# Patient Record
Sex: FEMALE | Race: WHITE | NOT HISPANIC OR LATINO | Employment: OTHER | URBAN - METROPOLITAN AREA
[De-identification: names, ages, dates, MRNs, and addresses within clinical notes are randomized per-mention and may not be internally consistent; named-entity substitution may affect disease eponyms.]

---

## 2018-06-25 ENCOUNTER — OFFICE VISIT (OUTPATIENT)
Dept: OBSTETRICS AND GYNECOLOGY | Facility: CLINIC | Age: 28
End: 2018-06-25

## 2018-06-25 VITALS
SYSTOLIC BLOOD PRESSURE: 118 MMHG | WEIGHT: 140 LBS | BODY MASS INDEX: 24.8 KG/M2 | DIASTOLIC BLOOD PRESSURE: 66 MMHG | HEIGHT: 63 IN

## 2018-06-25 DIAGNOSIS — Q43.8: ICD-10-CM

## 2018-06-25 DIAGNOSIS — Z13.9 SCREENING FOR CONDITION: ICD-10-CM

## 2018-06-25 DIAGNOSIS — O21.9 NAUSEA AND VOMITING IN PREGNANCY PRIOR TO 22 WEEKS GESTATION: ICD-10-CM

## 2018-06-25 DIAGNOSIS — N91.2 AMENORRHEA: Primary | ICD-10-CM

## 2018-06-25 LAB
B-HCG UR QL: POSITIVE
BILIRUB BLD-MCNC: NEGATIVE MG/DL
CLARITY, POC: CLEAR
COLOR UR: YELLOW
GLUCOSE UR STRIP-MCNC: NEGATIVE MG/DL
INTERNAL NEGATIVE CONTROL: NEGATIVE
INTERNAL POSITIVE CONTROL: POSITIVE
KETONES UR QL: NEGATIVE
LEUKOCYTE EST, POC: NEGATIVE
Lab: ABNORMAL
NITRITE UR-MCNC: NEGATIVE MG/ML
PH UR: 6 [PH] (ref 5–8)
PROT UR STRIP-MCNC: NEGATIVE MG/DL
RBC # UR STRIP: NEGATIVE /UL
SP GR UR: 1.02 (ref 1–1.03)
UROBILINOGEN UR QL: NORMAL

## 2018-06-25 PROCEDURE — 81002 URINALYSIS NONAUTO W/O SCOPE: CPT | Performed by: NURSE PRACTITIONER

## 2018-06-25 PROCEDURE — 81025 URINE PREGNANCY TEST: CPT | Performed by: NURSE PRACTITIONER

## 2018-06-25 PROCEDURE — 99214 OFFICE O/P EST MOD 30 MIN: CPT | Performed by: NURSE PRACTITIONER

## 2018-07-02 PROBLEM — O21.9 NAUSEA AND VOMITING IN PREGNANCY PRIOR TO 22 WEEKS GESTATION: Status: ACTIVE | Noted: 2018-07-02

## 2018-07-02 PROBLEM — N91.2 AMENORRHEA: Status: ACTIVE | Noted: 2018-07-02

## 2018-07-03 ENCOUNTER — TELEPHONE (OUTPATIENT)
Dept: OBSTETRICS AND GYNECOLOGY | Facility: CLINIC | Age: 28
End: 2018-07-03

## 2018-07-03 DIAGNOSIS — Q43.8: Primary | ICD-10-CM

## 2018-07-03 NOTE — TELEPHONE ENCOUNTER
Appt with [facility] has been canceled. Dr. Yost says that since the pt herself does not have ashok-danlos then there is no need for her to be seen by them. She would need to see a rheumatologist.

## 2018-07-03 NOTE — TELEPHONE ENCOUNTER
Can you check on a rheumatology appt, her sister has a genetic condition that can be complicating to pregnancy. She needs to be evaluated because she has symptoms. If we can not get her an appt with rheum quickly, we need an alternative plan. Thanks

## 2018-07-05 ENCOUNTER — APPOINTMENT (OUTPATIENT)
Dept: ULTRASOUND IMAGING | Facility: HOSPITAL | Age: 28
End: 2018-07-05

## 2018-07-10 ENCOUNTER — INITIAL PRENATAL (OUTPATIENT)
Dept: OBSTETRICS AND GYNECOLOGY | Facility: CLINIC | Age: 28
End: 2018-07-10

## 2018-07-10 ENCOUNTER — PROCEDURE VISIT (OUTPATIENT)
Dept: OBSTETRICS AND GYNECOLOGY | Facility: CLINIC | Age: 28
End: 2018-07-10

## 2018-07-10 VITALS — SYSTOLIC BLOOD PRESSURE: 122 MMHG | DIASTOLIC BLOOD PRESSURE: 70 MMHG | BODY MASS INDEX: 24.8 KG/M2 | WEIGHT: 140 LBS

## 2018-07-10 DIAGNOSIS — Z34.91 INITIAL OBSTETRIC VISIT IN FIRST TRIMESTER: Primary | ICD-10-CM

## 2018-07-10 DIAGNOSIS — Z36.9 ENCOUNTER FOR ANTENATAL SCREENING, UNSPECIFIED: ICD-10-CM

## 2018-07-10 DIAGNOSIS — Q43.8: ICD-10-CM

## 2018-07-10 DIAGNOSIS — O36.80X0 ENCOUNTER TO DETERMINE FETAL VIABILITY OF PREGNANCY, SINGLE OR UNSPECIFIED FETUS: Primary | ICD-10-CM

## 2018-07-10 LAB
C TRACH RRNA SPEC DONR QL NAA+PROBE: NEGATIVE
EXTERNAL AMPHETAMINE SCREEN URINE: NEGATIVE
N GONORRHOEA DNA SPEC QL NAA+PROBE: NEGATIVE

## 2018-07-10 PROCEDURE — 0501F PRENATAL FLOW SHEET: CPT | Performed by: NURSE PRACTITIONER

## 2018-07-10 PROCEDURE — 76801 OB US < 14 WKS SINGLE FETUS: CPT | Performed by: NURSE PRACTITIONER

## 2018-07-10 RX ORDER — PRENATAL VIT/IRON FUM/FOLIC AC 27MG-0.8MG
TABLET ORAL DAILY
COMMUNITY
End: 2019-05-16

## 2018-07-10 NOTE — PROGRESS NOTES
Initial ob visit     Chief Complaint   Patient presents with   • Initial Prenatal Visit       Hilary Morgan is being seen today for her first obstetrical visit.  She is a 27 y.o.    10w2d gestation.     #: 1, Date: None, Sex: None, Weight: None, GA: None, Delivery: None, Apgar1: None, Apgar5: None, Living: None, Birth Comments: None    #: 2, Date: None, Sex: None, Weight: None, GA: None, Delivery: None, Apgar1: None, Apgar5: None, Living: None, Birth Comments: None      Prepregnancy BMI - Body mass index is 24.8 kg/m².    Past Medical History:   Diagnosis Date   • Congenital prolapsed rectum    • Congenital prolapsed rectum        No past surgical history on file.      Current Outpatient Prescriptions:   •  Prenatal Vit-Fe Fumarate-FA (PRENATAL VITAMIN 27-0.8) 27-0.8 MG tablet tablet, Take  by mouth Daily., Disp: , Rfl:     No Known Allergies    Social History     Social History   • Marital status:      Spouse name: N/A   • Number of children: N/A   • Years of education: N/A     Occupational History   • Not on file.     Social History Main Topics   • Smoking status: Never Smoker   • Smokeless tobacco: Not on file   • Alcohol use No   • Drug use: No   • Sexual activity: Yes     Partners: Male     Other Topics Concern   • Not on file     Social History Narrative   • No narrative on file       Family History   Problem Relation Age of Onset   • No Known Problems Father    • No Known Problems Mother    • Shelley-Danlos syndrome Sister        Review of systems     A comprehensive review of systems was negative except for: Gastrointestinal: positive for nausea     Objective    /70   Wt 63.5 kg (140 lb)   LMP 2018 (Exact Date)   BMI 24.80 kg/m²       General Appearance:    Alert, cooperative, in no acute distress, habitus normal    Head:    Not examined   Eyes:           Not examined   Ears:  Not examined       Neck: Not examined    Back:     No kyphosis present, no scoliosis present,                        Lungs:     Clear to auscultation,respirations regular, even and                   unlabored    Heart:    Regular rhythm and normal rate, normal S1 and S2, no            murmur, no gallop, no rub, no click   Breast Exam:    No masses, No nipple discharge   Abdomen:     Normal bowel sounds, no masses, no organomegaly, soft        non-tender, non-distended, no guarding, no rebound                 tenderness   Genitalia:    Vulva - No masses, no atrophy, no lesions    Vagina - No discharge, No bleeding    Cervix - No Lesions, closed. Pap collected.      Uterus - Consistent with 10 weeks.     Adnexa - No masses, non tender       Extremities:   Moves all extremities well, no edema, no cyanosis, no              redness   Pulses:   Pulses palpable and equal bilaterally   Skin:   No bleeding, bruising or rash   Lymph nodes:   No palpable adenopathy   Neurologic:   Sensation intact, A&O times 3      Assessment    1) Pregnancy at 10w2d- US IMP: Single, viable IUP @ 10.2 weeks. +FHTs.   2) Nausea- Is improving. Taking Vit B6.   3) Family history of Shelley Danlos Syndrome- Pt has never been tested. She does have a congenital prolapsed rectum. She reports she has not seen a physician for this since she was a child. She is worried she could have Shelley Danlos. Ref to rheumatology is in procress.        Plan  New OB exam completed, pap collected   Initial labs collected   Patient is taking Prenatal vitamins  Problem list reviewed and updated  Reviewed routine prenatal care with the office to include but not limited to expected weight gain during pregnancy, Tylenol products are fine, avoid aspirin and ibuprofen; Zika (travel restrictions/ok to use insect repellant); not to change cat litter; food restrictions; avoidance of alcohol, tobacco, drugs and saunas/hot tubs.   All questions answered.     RTO 4 weeks    Ana Carreon, [facility]  7/10/2018  4:33 PM

## 2018-07-11 LAB
ABO GROUP BLD: (no result)
BASOPHILS # BLD AUTO: 0 X10E3/UL (ref 0–0.2)
BASOPHILS NFR BLD AUTO: 0 %
BLD GP AB SCN SERPL QL: NEGATIVE
EOSINOPHIL # BLD AUTO: 0 X10E3/UL (ref 0–0.4)
EOSINOPHIL NFR BLD AUTO: 0 %
ERYTHROCYTE [DISTWIDTH] IN BLOOD BY AUTOMATED COUNT: 13.2 % (ref 12.3–15.4)
HBA1C MFR BLD: 4.6 % (ref 4.8–5.6)
HBV SURFACE AG SERPL QL IA: NEGATIVE
HCT VFR BLD AUTO: 37.1 % (ref 34–46.6)
HCV AB S/CO SERPL IA: <0.1 S/CO RATIO (ref 0–0.9)
HGB BLD-MCNC: 12.7 G/DL (ref 11.1–15.9)
HIV 1+2 AB+HIV1 P24 AG SERPL QL IA: NON REACTIVE
IMM GRANULOCYTES # BLD: 0 X10E3/UL (ref 0–0.1)
IMM GRANULOCYTES NFR BLD: 0 %
LYMPHOCYTES # BLD AUTO: 1.2 X10E3/UL (ref 0.7–3.1)
LYMPHOCYTES NFR BLD AUTO: 12 %
MCH RBC QN AUTO: 29.6 PG (ref 26.6–33)
MCHC RBC AUTO-ENTMCNC: 34.2 G/DL (ref 31.5–35.7)
MCV RBC AUTO: 87 FL (ref 79–97)
MONOCYTES # BLD AUTO: 0.6 X10E3/UL (ref 0.1–0.9)
MONOCYTES NFR BLD AUTO: 6 %
NEUTROPHILS # BLD AUTO: 7.9 X10E3/UL (ref 1.4–7)
NEUTROPHILS NFR BLD AUTO: 82 %
PLATELET # BLD AUTO: 261 X10E3/UL (ref 150–379)
RBC # BLD AUTO: 4.29 X10E6/UL (ref 3.77–5.28)
RH BLD: POSITIVE
RPR SER QL: NON REACTIVE
RUBV IGG SERPL IA-ACNC: 1.4 INDEX
WBC # BLD AUTO: 9.8 X10E3/UL (ref 3.4–10.8)

## 2018-07-12 LAB
BACTERIA UR CULT: NO GROWTH
BACTERIA UR CULT: NORMAL
C TRACH RRNA CVX QL NAA+PROBE: NEGATIVE
CONV .: NORMAL
CYTOLOGIST CVX/VAG CYTO: NORMAL
CYTOLOGY CVX/VAG DOC THIN PREP: NORMAL
DX ICD CODE: NORMAL
HIV 1 & 2 AB SER-IMP: NORMAL
N GONORRHOEA RRNA CVX QL NAA+PROBE: NEGATIVE
OTHER STN SPEC: NORMAL
PATH REPORT.FINAL DX SPEC: NORMAL
STAT OF ADQ CVX/VAG CYTO-IMP: NORMAL
T VAGINALIS RRNA SPEC QL NAA+PROBE: NEGATIVE

## 2018-07-13 LAB — DRUGS UR: NORMAL

## 2018-07-23 ENCOUNTER — TELEPHONE (OUTPATIENT)
Dept: OBSTETRICS AND GYNECOLOGY | Facility: CLINIC | Age: 28
End: 2018-07-23

## 2018-07-23 NOTE — TELEPHONE ENCOUNTER
"Pt called saying that she had fallen and wanted to know what sign and symptoms to look out for if something should go wrong. Said she hasn't been experiencing any abnormal pain (aside from what she is considering round ligament pain which is normal for her), cramping, bleeding or spotting. I informed her that all of those things would be what she needs to be aware of if there should be any indication something is \"wrong\" this early on in the pregnancy. Advised her to call and speak to you later tonight should she experience any of the above and to go to the ER if you saw fit.   "

## 2018-07-25 ENCOUNTER — TELEPHONE (OUTPATIENT)
Dept: OBSTETRICS AND GYNECOLOGY | Facility: CLINIC | Age: 28
End: 2018-07-25

## 2018-07-25 NOTE — TELEPHONE ENCOUNTER
Yes, any trauma can rarely lead to abruption but usually within the first 4 hrs after the trauma. If no bleeding and good fetal movement after 4 hrs, everything will be OK and no long term damage to fetus.

## 2018-07-25 NOTE — TELEPHONE ENCOUNTER
SENT REFERRAL TO [facility] THEY SENT BACK STATING THEY DO NOT SEE FOR DX Q43.8 CONGENITAL PROLAPSED RECTUM AND STATED THEY RECOMMENDED GENETICS

## 2018-07-26 NOTE — TELEPHONE ENCOUNTER
We think she could have yooner's danlos syndome but there is no way to include that in the diagnosis. Can you call them back?

## 2018-07-26 NOTE — TELEPHONE ENCOUNTER
Called and left message for reji to call me back [phone] ext [phone] added possible erhlers danlos syndrome to referral and refaxed [phone]

## 2018-08-07 ENCOUNTER — ROUTINE PRENATAL (OUTPATIENT)
Dept: OBSTETRICS AND GYNECOLOGY | Facility: CLINIC | Age: 28
End: 2018-08-07

## 2018-08-07 VITALS — SYSTOLIC BLOOD PRESSURE: 106 MMHG | BODY MASS INDEX: 24.8 KG/M2 | DIASTOLIC BLOOD PRESSURE: 60 MMHG | WEIGHT: 140 LBS

## 2018-08-07 DIAGNOSIS — Q43.8: ICD-10-CM

## 2018-08-07 DIAGNOSIS — Q79.60 EHLERS-DANLOS DISEASE: Primary | ICD-10-CM

## 2018-08-07 PROBLEM — N91.2 AMENORRHEA: Status: RESOLVED | Noted: 2018-07-02 | Resolved: 2018-08-07

## 2018-08-07 PROBLEM — Z34.91 INITIAL OBSTETRIC VISIT IN FIRST TRIMESTER: Status: RESOLVED | Noted: 2018-07-10 | Resolved: 2018-08-07

## 2018-08-07 PROCEDURE — 0502F SUBSEQUENT PRENATAL CARE: CPT | Performed by: OBSTETRICS & GYNECOLOGY

## 2018-08-07 NOTE — PROGRESS NOTES
Pt had multiple concers, all addressed..  Pt has shelley danlos syndrome. Extremely loose joints, fragile or stretchy skin, and a family history of Shelley-Danlos syndrome are often enough to make a diagnosis. Genetic tests can confirm the diagnosis in some cases and help rule out other problems.    Pt is still waiting for call from [facility].

## 2018-08-15 ENCOUNTER — TELEPHONE (OUTPATIENT)
Dept: OBSTETRICS AND GYNECOLOGY | Facility: CLINIC | Age: 28
End: 2018-08-15

## 2018-08-15 NOTE — TELEPHONE ENCOUNTER
Patient was seen by geneticists and they did dx with ashok danlos syndrome and they will send report

## 2018-08-16 DIAGNOSIS — Q79.60 EHLERS-DANLOS DISEASE: Primary | ICD-10-CM

## 2018-08-21 ENCOUNTER — ROUTINE PRENATAL (OUTPATIENT)
Dept: OBSTETRICS AND GYNECOLOGY | Facility: CLINIC | Age: 28
End: 2018-08-21

## 2018-08-21 VITALS — WEIGHT: 141 LBS | SYSTOLIC BLOOD PRESSURE: 130 MMHG | DIASTOLIC BLOOD PRESSURE: 62 MMHG | BODY MASS INDEX: 24.98 KG/M2

## 2018-08-21 DIAGNOSIS — Z3A.16 16 WEEKS GESTATION OF PREGNANCY: Primary | ICD-10-CM

## 2018-08-21 PROCEDURE — 0502F SUBSEQUENT PRENATAL CARE: CPT | Performed by: OBSTETRICS & GYNECOLOGY

## 2018-08-21 NOTE — PROGRESS NOTES
Rash, right shoulder pain, pelvic pain, thigh pain, unable to sleep due to pain, elbow, and knee pain

## 2018-08-21 NOTE — PROGRESS NOTES
OB follow up     Hilary Morgan is a 27 y.o.  16w2d being seen today for her obstetrical visit.  Patient reports no bleeding, no contractions and no leaking. Fetal movement: normal.    Review of Systems  No bleeding, No cramping/contractions     /62   Wt 64 kg (141 lb)   LMP 2018 (Exact Date)   BMI 24.98 kg/m²     FHT: present BPM   Uterine Size: 16 cm   Quad screen drawn.     Assessment/Plan:    1) 27 y.o.  -pregnancy at 16w2d    2)   Encounter Diagnosis   Name Primary?   • 16 weeks gestation of pregnancy Yes       3) Reviewed this stage of pregnancy  4) Problem list updated     Return in about 4 weeks (around 2018) for US, Anatomy, OB Tummy.      Jeferson Rocha MD    2018  5:14 PM

## 2018-08-28 LAB
2ND TRIMESTER 4 SCREEN SERPL-IMP: NORMAL
2ND TRIMESTER 4 SCREEN SERPL-IMP: NORMAL
AFP ADJ MOM SERPL: 0.85
AFP SERPL-MCNC: 29.9 NG/ML
AGE AT DELIVERY: 26.1 YR
FET TS 18 RISK FROM MAT AGE: NORMAL
FET TS 21 RISK FROM MAT AGE: 977
GA METHOD: NORMAL
GA: 16.2 WEEKS
HCG ADJ MOM SERPL: 0.6
HCG SERPL-ACNC: NORMAL MIU/ML
IDDM PATIENT QL: NO
INHIBIN A ADJ MOM SERPL: 0.54
INHIBIN A SERPL-MCNC: 95.49 PG/ML
LABORATORY COMMENT REPORT: NORMAL
MULTIPLE PREGNANCY: NO
NEURAL TUBE DEFECT RISK FETUS: NORMAL %
RESULT: NORMAL
TS 18 RISK FETUS: NORMAL
TS 21 RISK FETUS: NORMAL
U ESTRIOL ADJ MOM SERPL: 0.98
U ESTRIOL SERPL-MCNC: 0.81 NG/ML

## 2018-08-29 ENCOUNTER — HOSPITAL ENCOUNTER (OUTPATIENT)
Dept: ULTRASOUND IMAGING | Facility: HOSPITAL | Age: 28
Discharge: HOME | End: 2018-08-29
Admitting: NURSE PRACTITIONER

## 2018-08-29 DIAGNOSIS — Q79.60 EHLERS-DANLOS DISEASE: Primary | ICD-10-CM

## 2018-08-29 DIAGNOSIS — Q79.60 EHLERS-DANLOS DISEASE: ICD-10-CM

## 2018-08-29 DIAGNOSIS — Z3A.17 17 WEEKS GESTATION OF PREGNANCY: ICD-10-CM

## 2018-08-29 PROCEDURE — 76805 OB US >/= 14 WKS SNGL FETUS: CPT

## 2018-08-29 PROCEDURE — 76817 TRANSVAGINAL US OBSTETRIC: CPT

## 2018-08-29 NOTE — CONSULTS
[facility]  Maternal-Fetal Medicine Consult Note    Dear Ms. Carreon:     I saw the above named patient for consultation upon your request due to Shelley-Danlos Syndrome.     I was able to view her prenatal record in EPIC.     Normal fetal anatomy and cervical length..     There is an increased risk for PROM, PTL, and PTD - as high as 25%.  There is an increased of intrapartum and postpartum hemorrhage, appropriate precautions advised.  Operative delivery should be avoided if possible as there are reported increased risk for complications of healing either vaginally or abdominally.     As it is autosomal dominant, 50% of offspring with have the disease - particular attention to hip dislocation.     A follow up scan at 28-30 weeks is suggested to screen for SGA/IUGR, placental cord insertion, and assess for appropriate interval fetal growth.     Subsequent cervical length assessment for patient symptomatology of contractions or suspicion of PTL.     Recommend predelivery anesthesia consult and thorough cardiology consult to rule out vascular type of EDS.     Recommend general surgery or colon rectal consultation regarding rectal prolapse that is reported to have been present since birth.     If you wish, the follow up imaging and testing can be performed in your office.  Alternatively, we would be happy to perform the evaluation in the [facility].  Please call to schedule if you would prefer.     Cystic Fibrosis and SMA carrier screening is advised by ACOG and ACMG, and I encourage screening through your office.  Alternatively, consider Expanded Carrier Screening.     ACOG along with the CDC recommend Tdap vaccine after 20 weeks gestation during each pregnancy and a flu vaccine during the flu season to provide passive immunity to the .     ACOG and USPSTF advise low dose aspirin (81 mg daily) after 12 weeks for history of preeclampsia, multifetal gestation, CHTN, Type I or II DM, renal disease, SLE or APLAS.  Those  with multiple risks including obesity, nulliparity, low socioeconomic status, age > 35, or AA race may also be considered candidates.      DVT Prophylaxis: SCDs during any hospital care, particularly peripartum.  If C/S  delivery is required, recommend SCDs and antibiotic prophylaxis plus early ambulation, and postpartum Lovenox 60 mg daily beginning 12-24 hrs post-op and continued to hospital discharge.     For billing purposes, 15 min spent face-to-face with the patient of which > 50% devoted to patient counseling and coordination of care, exclusive of ultrasound exam.     If you have any questions about the results of this sonogram or my recommendations, please feel free to contact me at any time.     Thank you for referring this patient to the [facility] at [facility].  If you have any questions about the results of this examination or my recommendations, please feel free to contact me at your convenience.     Sincerely yours,    Neymar Yost MD  8/29/2018  1:06 PM

## 2018-09-05 ENCOUNTER — OFFICE VISIT (OUTPATIENT)
Dept: SURGERY | Facility: CLINIC | Age: 28
End: 2018-09-05

## 2018-09-05 VITALS
DIASTOLIC BLOOD PRESSURE: 70 MMHG | HEART RATE: 88 BPM | OXYGEN SATURATION: 99 % | SYSTOLIC BLOOD PRESSURE: 110 MMHG | TEMPERATURE: 98.6 F | WEIGHT: 142.8 LBS | HEIGHT: 63 IN | BODY MASS INDEX: 25.3 KG/M2

## 2018-09-05 DIAGNOSIS — K62.3 RECTAL PROLAPSE: Primary | ICD-10-CM

## 2018-09-05 DIAGNOSIS — Q79.60 EHLERS-DANLOS SYNDROME: ICD-10-CM

## 2018-09-05 PROCEDURE — 99244 OFF/OP CNSLTJ NEW/EST MOD 40: CPT | Performed by: COLON & RECTAL SURGERY

## 2018-09-05 RX ORDER — ACETAMINOPHEN 325 MG/1
325 TABLET ORAL EVERY 6 HOURS PRN
COMMUNITY
End: 2019-04-11

## 2018-09-05 NOTE — PROGRESS NOTES
Hilary Morgan is a 27 y.o. female who is seen as a consult at the request of SIRE[facility] Mcneil for extruding rectal tissue      HPI:    Pt who is 18 weeks pregnant with recently diagnosed Shelley-Danlos c/o extruding tissue per rectum: about an inch    She states rectal prolapse noted in infancy, but reduced immediately, and no intervention required  Again noted age 9, but spontaneously reduced, so no intervention  She has never had a time when she has not been able to reduce  She has never had surgery for this    Tissue prolapses with every BM    She has BM qod-qd  She is taking colace 3 times in the past month  She previously took benefiber which helped for about a month, but then stopped helping, so she d/c'ed  She has not tried miralax    No enemas or suppositories    She states her stools are dark  Taking prenatal vitamin with Fe    Pt c/o fecal urgency with 3 accidents in the past year, full soilage    No blood per rectum  She denies mucus  Occasional anorectal pain    She has never had a colonoscopy    FamHx: no known hx colon polyps or colon cancer.  Hepatic cancer father diagnosed in his 50s, secondary to EtOH abuse per pt    Pt states OB/GYNs asking if she can have vaginal birth, or if rectal prolapse will necessitate     Past Medical History:   Diagnosis Date   • Congenital prolapsed rectum    • Shelley-Danlos disease    • Joint dislocation     MULTIPLE ESPECIALLY IN ANKLES       History reviewed. No pertinent surgical history.    Social History:   reports that she has never smoked. She has never used smokeless tobacco. She reports that she drinks alcohol. She reports that she does not use drugs.      Marriage status:     Family History   Problem Relation Age of Onset   • Liver disease Father    • No Known Problems Mother    • Shelley-Danlos syndrome Sister    • Anxiety disorder Maternal Aunt    • Fibromyalgia Maternal Aunt    • Shelley-Danlos syndrome Maternal Aunt    • Anxiety disorder  Maternal Uncle    • Depression Maternal Uncle    • Thyroid disease Maternal Grandmother    • No Known Problems Maternal Grandfather          Current Outpatient Prescriptions:   •  acetaminophen (TYLENOL) 325 MG tablet, Take 650 mg by mouth Every 6 (Six) Hours As Needed for Mild Pain ., Disp: , Rfl:   •  Prenatal Vit-Fe Fumarate-FA (PRENATAL VITAMIN 27-0.8) 27-0.8 MG tablet tablet, Take  by mouth Daily., Disp: , Rfl:     Allergy  Patient has no known allergies.    Review of Systems   Constitution: Negative for decreased appetite, weakness and weight gain.   HENT: Negative for congestion, hearing loss and hoarse voice.    Eyes: Negative for blurred vision, discharge and visual disturbance.   Cardiovascular: Negative for chest pain, cyanosis and leg swelling.   Respiratory: Negative for cough, shortness of breath, sleep disturbances due to breathing and snoring.    Endocrine: Positive for heat intolerance. Negative for cold intolerance.   Hematologic/Lymphatic: Bruises/bleeds easily.   Skin: Positive for itching. Negative for poor wound healing and skin cancer.   Musculoskeletal: Positive for back pain, joint pain and joint swelling. Negative for arthritis.   Gastrointestinal: Positive for change in bowel habit, bowel incontinence and constipation. Negative for abdominal pain.   Genitourinary: Positive for bladder incontinence. Negative for dysuria and hematuria.   Neurological: Positive for headaches. Negative for brief paralysis, excessive daytime sleepiness, dizziness, focal weakness and light-headedness.   Psychiatric/Behavioral: Negative for altered mental status and hallucinations. The patient does not have insomnia.    Allergic/Immunologic: Negative for HIV exposure and persistent infections.   All other systems reviewed and are negative.      Vitals:    09/05/18 1318   BP: 110/70   Pulse: 88   Temp: 98.6 °F (37 °C)   SpO2: 99%     Body mass index is 25.3 kg/m².    Physical Exam   Constitutional: She is oriented  to person, place, and time. She appears well-developed and well-nourished. No distress.   HENT:   Head: Normocephalic and atraumatic.   Nose: Nose normal.   Mouth/Throat: Oropharynx is clear and moist.   Eyes: Pupils are equal, round, and reactive to light. Conjunctivae and EOM are normal.   Neck: Normal range of motion. No tracheal deviation present.   Pulmonary/Chest: Effort normal and breath sounds normal. No respiratory distress.   Abdominal: Soft. Bowel sounds are normal. She exhibits no distension.   Genitourinary:   Genitourinary Comments: Commode exam: 1cm circumferential prolapse on bear down, spontaneously reduces   Musculoskeletal: Normal range of motion. She exhibits no edema or deformity.   Neurological: She is alert and oriented to person, place, and time. No cranial nerve deficit. Coordination and gait normal.   Skin: Skin is warm and dry.   Psychiatric: She has a normal mood and affect. Her behavior is normal. Judgment normal.       Review of Medical Record:  I reviewed [facility] records: rectal prolapse    Assessment:  1. Rectal prolapse    2. Shelley-Danlos syndrome        Plan:    -extensive discussion with patient as she is pregnant, and rectal prolapse currently small and reduces spontaneously, would not recommend rectopexy at this time.  Would recommend reevaluation for rectal prolapse repair when she is no longer pregnant, or if symptoms worsen.  -would recommend  over vaginal delivery, as prolapse could worsen, or possibly incarcerate/strangulate with straining during trial of labor  -for constipation, recommend miralax, and gave written instructions for use    RTC after she delivers, or if symptoms worsen      Scribed for Gabby Tenorio MD by Julia Padilla PA-C 2018  This patient was evaluated by me, recommendations made, documentation reviewed, edited, and revised by me, Gabby Tenorio MD

## 2018-09-06 PROBLEM — O28.3 ABNORMAL PREGNANCY US: Status: ACTIVE | Noted: 2018-09-06

## 2018-09-19 ENCOUNTER — ROUTINE PRENATAL (OUTPATIENT)
Dept: OBSTETRICS AND GYNECOLOGY | Facility: CLINIC | Age: 28
End: 2018-09-19

## 2018-09-19 ENCOUNTER — PROCEDURE VISIT (OUTPATIENT)
Dept: OBSTETRICS AND GYNECOLOGY | Facility: CLINIC | Age: 28
End: 2018-09-19

## 2018-09-19 VITALS — SYSTOLIC BLOOD PRESSURE: 120 MMHG | DIASTOLIC BLOOD PRESSURE: 76 MMHG

## 2018-09-19 DIAGNOSIS — Z36.89 ENCOUNTER FOR FETAL ANATOMIC SURVEY: Primary | ICD-10-CM

## 2018-09-19 DIAGNOSIS — O47.02 THREATENED PREMATURE LABOR IN SECOND TRIMESTER: ICD-10-CM

## 2018-09-19 DIAGNOSIS — Q79.60 EHLERS-DANLOS SYNDROME: ICD-10-CM

## 2018-09-19 DIAGNOSIS — Z34.82 PRENATAL CARE, SUBSEQUENT PREGNANCY IN SECOND TRIMESTER: Primary | ICD-10-CM

## 2018-09-19 PROCEDURE — 90656 IIV3 VACC NO PRSV 0.5 ML IM: CPT | Performed by: OBSTETRICS & GYNECOLOGY

## 2018-09-19 PROCEDURE — 90471 IMMUNIZATION ADMIN: CPT | Performed by: OBSTETRICS & GYNECOLOGY

## 2018-09-19 PROCEDURE — 76805 OB US >/= 14 WKS SNGL FETUS: CPT | Performed by: OBSTETRICS & GYNECOLOGY

## 2018-09-19 PROCEDURE — 76817 TRANSVAGINAL US OBSTETRIC: CPT | Performed by: OBSTETRICS & GYNECOLOGY

## 2018-09-19 PROCEDURE — 90472 IMMUNIZATION ADMIN EACH ADD: CPT | Performed by: OBSTETRICS & GYNECOLOGY

## 2018-09-19 PROCEDURE — 0502F SUBSEQUENT PRENATAL CARE: CPT | Performed by: OBSTETRICS & GYNECOLOGY

## 2018-09-20 PROCEDURE — 90715 TDAP VACCINE 7 YRS/> IM: CPT | Performed by: OBSTETRICS & GYNECOLOGY

## 2018-09-24 ENCOUNTER — ROUTINE PRENATAL (OUTPATIENT)
Dept: OBSTETRICS AND GYNECOLOGY | Facility: CLINIC | Age: 28
End: 2018-09-24

## 2018-09-24 VITALS — BODY MASS INDEX: 26.57 KG/M2 | DIASTOLIC BLOOD PRESSURE: 76 MMHG | SYSTOLIC BLOOD PRESSURE: 122 MMHG | WEIGHT: 150 LBS

## 2018-09-24 DIAGNOSIS — Q79.62 EHLERS-DANLOS, HYPERMOBILE TYPE: Primary | ICD-10-CM

## 2018-09-24 DIAGNOSIS — Q43.8: ICD-10-CM

## 2018-09-24 DIAGNOSIS — Z3A.21 21 WEEKS GESTATION OF PREGNANCY: ICD-10-CM

## 2018-09-24 PROBLEM — Z34.90 PREGNANCY: Status: ACTIVE | Noted: 2018-09-24

## 2018-09-24 PROCEDURE — 0502F SUBSEQUENT PRENATAL CARE: CPT | Performed by: OBSTETRICS & GYNECOLOGY

## 2018-09-24 NOTE — PROGRESS NOTES
Here for f/u.  Pt has occassional falls.    Pending:  Cardiology consult, PAT consult with anesthesia; further genetic testing (CF, SGA).  Will discuss pt's further care at next MD meeting.

## 2018-09-26 PROBLEM — O09.90 HIGH-RISK PREGNANCY: Status: ACTIVE | Noted: 2018-09-26

## 2018-10-01 ENCOUNTER — ROUTINE PRENATAL (OUTPATIENT)
Dept: OBSTETRICS AND GYNECOLOGY | Facility: CLINIC | Age: 28
End: 2018-10-01

## 2018-10-01 VITALS — DIASTOLIC BLOOD PRESSURE: 64 MMHG | WEIGHT: 149.8 LBS | BODY MASS INDEX: 26.54 KG/M2 | SYSTOLIC BLOOD PRESSURE: 100 MMHG

## 2018-10-01 DIAGNOSIS — Q79.62 EHLERS-DANLOS, HYPERMOBILE TYPE: ICD-10-CM

## 2018-10-01 DIAGNOSIS — O28.3 ABNORMAL PREGNANCY US: ICD-10-CM

## 2018-10-01 DIAGNOSIS — O09.92 HIGH-RISK PREGNANCY IN SECOND TRIMESTER: Primary | ICD-10-CM

## 2018-10-01 DIAGNOSIS — Q43.8: ICD-10-CM

## 2018-10-01 PROCEDURE — 59425 ANTEPARTUM CARE ONLY: CPT | Performed by: OBSTETRICS & GYNECOLOGY

## 2018-10-01 NOTE — PROGRESS NOTES
Patient Active Problem List   Diagnosis   • Congenital prolapsed rectum   • Nausea and vomiting in pregnancy prior to 22 weeks gestation   • Shelley-Danlos, hypermobile type   • Abnormal pregnancy US   • Pregnancy   • High-risk pregnancy- MD meeting recs transfer to [facility]         Pt doing ok.  Pelvic discomfort seems to be increasing.     Had discussion with pt and her .  Due to high risk conditions of pregnancy, after careful discussion and consideration, we recommend that pt transfer her care to [facility] for further care to due issures related to her Shelley-Danlos diagnosis.  Pt and her  understand and agree and consent to transfer.  Transfer of record release signed.

## 2018-10-01 NOTE — PROGRESS NOTES
"Date of Office Visit: 10/02/2018  Encounter Provider: Christiano Julian MD  Place of Service: [facility] CARDIOLOGY  Patient Name: Hilary Morgan  :1990    Chief Complaint   Patient presents with   • Shelley-Danlos Syndrome   :     HPI: Hilary Morgan is a 27 y.o. female who presents today in consultation for a new diagnosis of EDS in pregnancy.  She has always suspected that she has EDS but recently received the clinical diagnosis of Hypermobile EDS at the [facility].  Her sister has EDS.  She has had recurrent joint dislocations.  She has congenital rectal prolapse.    She denies any history of tachycardia, palpitations, syncope, or near syncope.  She denies any history of chest pain.  She is currently 22 weeks pregnant with her first child and had \"morning sickness\" her first trimester but it's improved.  She denies any family history of sudden death at a young age.    Past Medical History:   Diagnosis Date   • Congenital prolapsed rectum    • Shelley-Danlos disease    • Joint dislocation     MULTIPLE ESPECIALLY IN ANKLES       Past Surgical History:   Procedure Laterality Date   • NO PAST SURGERIES         Social History     Social History   • Marital status:      Spouse name: N/A   • Number of children: 0   • Years of education: N/A     Occupational History   • Not on file.     Social History Main Topics   • Smoking status: Never Smoker   • Smokeless tobacco: Never Used      Comment: CAFFEINE USE: NONE   • Alcohol use No      Comment: MODERATE AMT   • Drug use: No   • Sexual activity: Yes     Partners: Male     Birth control/ protection: None     Other Topics Concern   • Not on file     Social History Narrative   • No narrative on file       Family History   Problem Relation Age of Onset   • Liver disease Father    • No Known Problems Mother    • Shelley-Danlos syndrome Sister    • Anxiety disorder Maternal Aunt    • Fibromyalgia Maternal Aunt    • Shelley-Danlos " "syndrome Maternal Aunt    • Anxiety disorder Maternal Uncle    • Depression Maternal Uncle    • Thyroid disease Maternal Grandmother    • No Known Problems Maternal Grandfather        Review of Systems   Cardiovascular: Negative for chest pain.   Musculoskeletal: Positive for joint pain.   Gastrointestinal: Positive for nausea.   Neurological: Negative for light-headedness.       No Known Allergies      Current Outpatient Prescriptions:   •  acetaminophen (TYLENOL) 325 MG tablet, Take 325 mg by mouth Every 6 (Six) Hours As Needed for Mild Pain ., Disp: , Rfl:   •  aspirin 81 MG tablet, Take 81 mg by mouth Daily., Disp: , Rfl:   •  Prenatal Vit-Fe Fumarate-FA (PRENATAL VITAMIN 27-0.8) 27-0.8 MG tablet tablet, Take  by mouth Daily., Disp: , Rfl:       Objective:     Vitals:    10/02/18 1306   BP: 102/56   BP Location: Right arm   Pulse: 86   Weight: 68 kg (150 lb)   Height: 160 cm (63\")     Body mass index is 26.57 kg/m².    Physical Exam   Constitutional: She is oriented to person, place, and time. She appears well-developed and well-nourished.   HENT:   Head: Normocephalic.   Nose: Nose normal.   Mouth/Throat: Oropharynx is clear and moist.   Eyes: Pupils are equal, round, and reactive to light. Conjunctivae and EOM are normal.   Neck: Normal range of motion. No JVD present.   Cardiovascular: Normal rate, regular rhythm and intact distal pulses.    Murmur heard.   Systolic murmur is present with a grade of 1/6  at the upper right sternal border  Pulmonary/Chest: Effort normal and breath sounds normal.   Abdominal: Soft.   Gravid, nontender   Musculoskeletal: Normal range of motion. She exhibits no edema.   Lymphadenopathy:     She has no cervical adenopathy.   Neurological: She is alert and oriented to person, place, and time. No cranial nerve deficit.   Skin: Skin is warm and dry. No rash noted.   Psychiatric: She has a normal mood and affect. Her behavior is normal. Judgment and thought content normal.   Vitals " reviewed.        ECG 12 Lead  Date/Time: 10/2/2018 1:34 PM  Performed by: CHRISTIANO JULIAN  Authorized by: CHRISTIANO JULIAN   Comparison: not compared with previous ECG   Previous ECG: no previous ECG available  Rhythm: sinus rhythm  Conduction: conduction normal  ST Segments: ST segments normal  T Waves: T waves normal  QRS axis: normal  Other: no other findings  Clinical impression: normal ECG              Assessment:       Diagnosis Plan   1. EDS (Shelley-Danlos syndrome)  Adult Transthoracic Echo Complete W/ Cont if Necessary Per Protocol   2. 22 weeks gestation of pregnancy  Adult Transthoracic Echo Complete W/ Cont if Necessary Per Protocol          Plan:       She has a clinical diagnosis of hypermobile EDS.  Her cardiac exam is normal; she has a subtle respirophasic murmur at the RUSB which is likely due to being pregnant.  I will check an echo to look at her mitral valve and aortic root.  If this is unremarkable, I would repeat the echo in ten years.  Also, if it's normal, there are no special cardiac precautions regarding the remainder of her pregnancy or her delivery.    We did discuss how patients with EDS do have a higher rate of POTS and dyautonomias, but at 26yo she has never had any symptoms suggestive of these diagnoses, so it's less likely she'll develop them.     Sincerely,       Christiano Julian MD

## 2018-10-02 ENCOUNTER — OFFICE VISIT (OUTPATIENT)
Dept: CARDIOLOGY | Facility: CLINIC | Age: 28
End: 2018-10-02

## 2018-10-02 VITALS
BODY MASS INDEX: 26.58 KG/M2 | HEART RATE: 86 BPM | DIASTOLIC BLOOD PRESSURE: 56 MMHG | HEIGHT: 63 IN | SYSTOLIC BLOOD PRESSURE: 102 MMHG | WEIGHT: 150 LBS

## 2018-10-02 DIAGNOSIS — Z3A.22 22 WEEKS GESTATION OF PREGNANCY: ICD-10-CM

## 2018-10-02 DIAGNOSIS — Q79.60 EDS (EHLERS-DANLOS SYNDROME): Primary | ICD-10-CM

## 2018-10-02 PROCEDURE — 99244 OFF/OP CNSLTJ NEW/EST MOD 40: CPT | Performed by: INTERNAL MEDICINE

## 2018-10-02 PROCEDURE — 93000 ELECTROCARDIOGRAM COMPLETE: CPT | Performed by: INTERNAL MEDICINE

## 2018-10-04 ENCOUNTER — HOSPITAL ENCOUNTER (OUTPATIENT)
Dept: CARDIOLOGY | Facility: HOSPITAL | Age: 28
Discharge: HOME | End: 2018-10-04
Attending: INTERNAL MEDICINE | Admitting: INTERNAL MEDICINE

## 2018-10-04 VITALS
HEIGHT: 64 IN | DIASTOLIC BLOOD PRESSURE: 68 MMHG | HEART RATE: 89 BPM | WEIGHT: 151 LBS | SYSTOLIC BLOOD PRESSURE: 129 MMHG | OXYGEN SATURATION: 100 % | BODY MASS INDEX: 25.78 KG/M2

## 2018-10-04 DIAGNOSIS — Q79.60 EDS (EHLERS-DANLOS SYNDROME): ICD-10-CM

## 2018-10-04 DIAGNOSIS — Z3A.22 22 WEEKS GESTATION OF PREGNANCY: ICD-10-CM

## 2018-10-04 LAB
AORTIC DIMENSIONLESS INDEX: 0.8 (DI)
BH CV ECHO MEAS - ACS: 1.9 CM
BH CV ECHO MEAS - AO MAX PG (FULL): 3.4 MMHG
BH CV ECHO MEAS - AO MAX PG: 7.8 MMHG
BH CV ECHO MEAS - AO MEAN PG (FULL): 2 MMHG
BH CV ECHO MEAS - AO MEAN PG: 4 MMHG
BH CV ECHO MEAS - AO ROOT AREA (BSA CORRECTED): 1.6
BH CV ECHO MEAS - AO ROOT AREA: 5.7 CM^2
BH CV ECHO MEAS - AO ROOT DIAM: 2.7 CM
BH CV ECHO MEAS - AO V2 MAX: 140 CM/SEC
BH CV ECHO MEAS - AO V2 MEAN: 89.5 CM/SEC
BH CV ECHO MEAS - AO V2 VTI: 29.6 CM
BH CV ECHO MEAS - AVA(I,A): 2.2 CM^2
BH CV ECHO MEAS - AVA(I,D): 2.2 CM^2
BH CV ECHO MEAS - AVA(V,A): 2.1 CM^2
BH CV ECHO MEAS - AVA(V,D): 2.1 CM^2
BH CV ECHO MEAS - BSA(HAYCOCK): 1.8 M^2
BH CV ECHO MEAS - BSA: 1.7 M^2
BH CV ECHO MEAS - BZI_BMI: 25.9 KILOGRAMS/M^2
BH CV ECHO MEAS - BZI_METRIC_HEIGHT: 162.6 CM
BH CV ECHO MEAS - BZI_METRIC_WEIGHT: 68.5 KG
BH CV ECHO MEAS - EDV(CUBED): 122.8 ML
BH CV ECHO MEAS - EDV(MOD-SP2): 138 ML
BH CV ECHO MEAS - EDV(MOD-SP4): 92.3 ML
BH CV ECHO MEAS - EDV(TEICH): 116.6 ML
BH CV ECHO MEAS - EF(CUBED): 74.3 %
BH CV ECHO MEAS - EF(MOD-BP): 61 %
BH CV ECHO MEAS - EF(MOD-SP2): 65.9 %
BH CV ECHO MEAS - EF(MOD-SP4): 56.2 %
BH CV ECHO MEAS - EF(TEICH): 65.9 %
BH CV ECHO MEAS - ESV(CUBED): 31.6 ML
BH CV ECHO MEAS - ESV(MOD-SP2): 47 ML
BH CV ECHO MEAS - ESV(MOD-SP4): 40.4 ML
BH CV ECHO MEAS - ESV(TEICH): 39.7 ML
BH CV ECHO MEAS - FS: 36.4 %
BH CV ECHO MEAS - IVS/LVPW: 1.1
BH CV ECHO MEAS - IVSD: 0.69 CM
BH CV ECHO MEAS - LA DIMENSION: 2.9 CM
BH CV ECHO MEAS - LA/AO: 1.1
BH CV ECHO MEAS - LAT PEAK E' VEL: 15 CM/SEC
BH CV ECHO MEAS - LV DIASTOLIC VOL/BSA (35-75): 53.2 ML/M^2
BH CV ECHO MEAS - LV MASS(C)D: 108.8 GRAMS
BH CV ECHO MEAS - LV MASS(C)DI: 62.7 GRAMS/M^2
BH CV ECHO MEAS - LV MAX PG: 4.4 MMHG
BH CV ECHO MEAS - LV MEAN PG: 2 MMHG
BH CV ECHO MEAS - LV SYSTOLIC VOL/BSA (12-30): 23.3 ML/M^2
BH CV ECHO MEAS - LV V1 MAX: 105 CM/SEC
BH CV ECHO MEAS - LV V1 MEAN: 70.8 CM/SEC
BH CV ECHO MEAS - LV V1 VTI: 22.6 CM
BH CV ECHO MEAS - LVIDD: 5 CM
BH CV ECHO MEAS - LVIDS: 3.2 CM
BH CV ECHO MEAS - LVLD AP2: 9.6 CM
BH CV ECHO MEAS - LVLD AP4: 8.9 CM
BH CV ECHO MEAS - LVLS AP2: 8.1 CM
BH CV ECHO MEAS - LVLS AP4: 7.5 CM
BH CV ECHO MEAS - LVOT AREA (M): 2.8 CM^2
BH CV ECHO MEAS - LVOT AREA: 2.8 CM^2
BH CV ECHO MEAS - LVOT DIAM: 1.9 CM
BH CV ECHO MEAS - LVPWD: 0.66 CM
BH CV ECHO MEAS - MED PEAK E' VEL: 11 CM/SEC
BH CV ECHO MEAS - MV A DUR: 0.17 SEC
BH CV ECHO MEAS - MV A MAX VEL: 81.5 CM/SEC
BH CV ECHO MEAS - MV DEC SLOPE: 496 CM/SEC^2
BH CV ECHO MEAS - MV DEC TIME: 0.22 SEC
BH CV ECHO MEAS - MV E MAX VEL: 108 CM/SEC
BH CV ECHO MEAS - MV E/A: 1.3
BH CV ECHO MEAS - MV MAX PG: 4.2 MMHG
BH CV ECHO MEAS - MV MEAN PG: 2 MMHG
BH CV ECHO MEAS - MV P1/2T MAX VEL: 102 CM/SEC
BH CV ECHO MEAS - MV P1/2T: 60.2 MSEC
BH CV ECHO MEAS - MV V2 MAX: 102 CM/SEC
BH CV ECHO MEAS - MV V2 MEAN: 59.4 CM/SEC
BH CV ECHO MEAS - MV V2 VTI: 27.9 CM
BH CV ECHO MEAS - MVA P1/2T LCG: 2.2 CM^2
BH CV ECHO MEAS - MVA(P1/2T): 3.7 CM^2
BH CV ECHO MEAS - MVA(VTI): 2.3 CM^2
BH CV ECHO MEAS - PA ACC TIME: 0.11 SEC
BH CV ECHO MEAS - PA MAX PG (FULL): 4 MMHG
BH CV ECHO MEAS - PA MAX PG: 7.1 MMHG
BH CV ECHO MEAS - PA PR(ACCEL): 31.3 MMHG
BH CV ECHO MEAS - PA V2 MAX: 133 CM/SEC
BH CV ECHO MEAS - PI END-D VEL: 89.6 CM/SEC
BH CV ECHO MEAS - PULM A REVS DUR: 0.16 SEC
BH CV ECHO MEAS - PULM A REVS VEL: 44 CM/SEC
BH CV ECHO MEAS - PULM DIAS VEL: 62.8 CM/SEC
BH CV ECHO MEAS - PULM S/D: 1.3
BH CV ECHO MEAS - PULM SYS VEL: 84 CM/SEC
BH CV ECHO MEAS - PVA(V,A): 1.9 CM^2
BH CV ECHO MEAS - PVA(V,D): 1.9 CM^2
BH CV ECHO MEAS - QP/QS: 0.72
BH CV ECHO MEAS - RAP SYSTOLE: 3 MMHG
BH CV ECHO MEAS - RV MAX PG: 3.1 MMHG
BH CV ECHO MEAS - RV MEAN PG: 1 MMHG
BH CV ECHO MEAS - RV V1 MAX: 87.9 CM/SEC
BH CV ECHO MEAS - RV V1 MEAN: 55.1 CM/SEC
BH CV ECHO MEAS - RV V1 VTI: 16.2 CM
BH CV ECHO MEAS - RVOT AREA: 2.8 CM^2
BH CV ECHO MEAS - RVOT DIAM: 1.9 CM
BH CV ECHO MEAS - RVSP: 16.2 MMHG
BH CV ECHO MEAS - SI(AO): 97.6 ML/M^2
BH CV ECHO MEAS - SI(CUBED): 52.5 ML/M^2
BH CV ECHO MEAS - SI(LVOT): 36.9 ML/M^2
BH CV ECHO MEAS - SI(MOD-SP2): 52.4 ML/M^2
BH CV ECHO MEAS - SI(MOD-SP4): 29.9 ML/M^2
BH CV ECHO MEAS - SI(TEICH): 44.3 ML/M^2
BH CV ECHO MEAS - SV(AO): 169.5 ML
BH CV ECHO MEAS - SV(CUBED): 91.2 ML
BH CV ECHO MEAS - SV(LVOT): 64.1 ML
BH CV ECHO MEAS - SV(MOD-SP2): 91 ML
BH CV ECHO MEAS - SV(MOD-SP4): 51.9 ML
BH CV ECHO MEAS - SV(RVOT): 45.9 ML
BH CV ECHO MEAS - SV(TEICH): 76.9 ML
BH CV ECHO MEAS - TAPSE (>1.6): 2.2 CM2
BH CV ECHO MEAS - TR MAX VEL: 182 CM/SEC
BH CV ECHO MEASUREMENTS AVERAGE E/E' RATIO: 8.31
BH CV VAS BP RIGHT ARM: NORMAL MMHG
BH CV XLRA - RV BASE: 2.5 CM
BH CV XLRA - TDI S': 13 CM/SEC
LEFT ATRIUM VOLUME INDEX: 21 ML/M2
LEFT ATRIUM VOLUME: 33 CM3
MAXIMAL PREDICTED HEART RATE: 195 BPM
STRESS TARGET HR: 166 BPM

## 2018-10-04 PROCEDURE — 93306 TTE W/DOPPLER COMPLETE: CPT

## 2018-10-04 PROCEDURE — 93306 TTE W/DOPPLER COMPLETE: CPT | Performed by: INTERNAL MEDICINE

## 2019-01-17 ENCOUNTER — TELEPHONE (OUTPATIENT)
Dept: OBSTETRICS AND GYNECOLOGY | Facility: CLINIC | Age: 29
End: 2019-01-17

## 2019-01-17 NOTE — TELEPHONE ENCOUNTER
We can do her visit anywhere she wants.  When does she want me to call her?  I am leaving for the day.

## 2019-01-17 NOTE — TELEPHONE ENCOUNTER
PT CALLED AND WAS WANTING TO LET YOU KNOW HOW SHE WAS DOING AND WOULD LIKE TO SEE IF SHE COULD DO HER POST PARTUM IN OUR OFFICE INSTEAD OF DRIVING TO [place]. ALSO HAS SOME QUESTIONS FOR YOU AND COULD YOU POSSIBLY CALL HER?

## 2019-01-18 NOTE — TELEPHONE ENCOUNTER
I WON'T BE ABLE TO TALK TO HER ANYTIME SOON...    I CAN TALK TO HER AT HER PP VISIT IF SHE WANTS, UNLESS IT'S AN EMERGENCY

## 2019-03-18 ENCOUNTER — POSTPARTUM VISIT (OUTPATIENT)
Dept: OBSTETRICS AND GYNECOLOGY | Facility: CLINIC | Age: 29
End: 2019-03-18

## 2019-03-18 VITALS
HEIGHT: 64 IN | SYSTOLIC BLOOD PRESSURE: 98 MMHG | DIASTOLIC BLOOD PRESSURE: 56 MMHG | WEIGHT: 149 LBS | BODY MASS INDEX: 25.44 KG/M2

## 2019-03-18 DIAGNOSIS — Z98.891 S/P CESAREAN SECTION: ICD-10-CM

## 2019-03-18 DIAGNOSIS — Z11.51 SPECIAL SCREENING EXAMINATION FOR HUMAN PAPILLOMAVIRUS (HPV): ICD-10-CM

## 2019-03-18 DIAGNOSIS — Q79.62 EHLERS-DANLOS, HYPERMOBILE TYPE: ICD-10-CM

## 2019-03-18 DIAGNOSIS — N92.6 IRREGULAR UTERINE BLEEDING: ICD-10-CM

## 2019-03-18 LAB
B-HCG UR QL: NEGATIVE
INTERNAL NEGATIVE CONTROL: NEGATIVE
INTERNAL POSITIVE CONTROL: POSITIVE
Lab: NORMAL

## 2019-03-18 PROCEDURE — 81025 URINE PREGNANCY TEST: CPT | Performed by: NURSE PRACTITIONER

## 2019-03-18 PROCEDURE — 99213 OFFICE O/P EST LOW 20 MIN: CPT | Performed by: NURSE PRACTITIONER

## 2019-03-18 NOTE — PROGRESS NOTES
"Chief Complaint   Patient presents with   • Postpartum Care        HPI      Date of delivery: 19 PLTCS d/t Shelley Danlos with congenital rectal prolapse.  Baby Boy- Yoshi. Weight 8.2#.     The patient feels tired. Patient describes her bleeding as red. Described as bright red. Reports the bleeding stopped for approx 2 days but restarted. Thought she was starting her period but the bleeding has continued. She is not on any form of contraception.     Breastfeeding: infant latching without difficulty without pain.     Bladder normal function.  Reports has constipation, taking colace. Pt had constipation prior to delivery. Denies post-partum depression.    Taking prenatal vitamins.  Reports she's sleeping well.  Has not resumed sexual activity.      She also complains of pain around her incision. States the pain on the (L) side is intermittent and makes it difficult to walk when it occurs. She also notices  \"knot\" on her (R) side of the incision.     Review of Systems   Constitutional: Positive for fatigue.   Respiratory: Negative.    Cardiovascular: Negative.    Gastrointestinal: Negative.         Pain in incision    Endocrine: Negative.    Genitourinary: Positive for vaginal bleeding.   Musculoskeletal: Negative.    Skin: Negative.    Neurological: Negative.    Psychiatric/Behavioral: Negative.        The following portions of the patient's history were reviewed and updated as appropriate: allergies, current medications and problem list.             BP 98/56   Ht 162.6 cm (64.02\")   Wt 67.6 kg (149 lb)   LMP 2018 (Exact Date)   Breastfeeding? Unknown   BMI 25.56 kg/m²       Physical Exam   Constitutional: She is oriented to person, place, and time. She appears well-developed and well-nourished.   Abdominal: Soft. Bowel sounds are normal. Hernia confirmed negative in the right inguinal area and confirmed negative in the left inguinal area.        incision healing well    Genitourinary: Rectum " normal. Pelvic exam was performed with patient supine. There is no rash, tenderness, lesion or injury on the right labia. There is no rash, tenderness, lesion or injury on the left labia. Uterus is not deviated, not enlarged, not fixed and not tender. Cervix exhibits no motion tenderness, no discharge and no friability. Right adnexum displays no mass, no tenderness and no fullness. Left adnexum displays no mass, no tenderness and no fullness. No erythema, tenderness or bleeding in the vagina. No foreign body in the vagina. No signs of injury around the vagina. No vaginal discharge found.   Genitourinary Comments:  incision healing well.    Lymphadenopathy:        Right: No inguinal adenopathy present.        Left: No inguinal adenopathy present.   Neurological: She is alert and oriented to person, place, and time.   Skin: Skin is warm and dry.   Psychiatric: She has a normal mood and affect. Her behavior is normal.   Vitals reviewed.          1) 6 weeks postpartum: Progressing well but continues to have bleeding.      2) Postpartum Care: Check TVUS for bleeding. Rev warn s/s.     3) Gyn HM: Pap     4) Contraception: Does not want LARC. [employer]ology. Disc at length high risk pregnancy and possible complications associated with Ehrlers Danlos and repeat c-sections in pregnancy. Pt verbalized understanding.     5) RTO for TVUS for postpartum bleeding.       Aan Carreon, SIRENA  3/18/2019  10:12 AM

## 2019-03-25 ENCOUNTER — OFFICE VISIT (OUTPATIENT)
Dept: OBSTETRICS AND GYNECOLOGY | Facility: CLINIC | Age: 29
End: 2019-03-25

## 2019-03-25 ENCOUNTER — PROCEDURE VISIT (OUTPATIENT)
Dept: OBSTETRICS AND GYNECOLOGY | Facility: CLINIC | Age: 29
End: 2019-03-25

## 2019-03-25 VITALS
WEIGHT: 148 LBS | BODY MASS INDEX: 25.27 KG/M2 | SYSTOLIC BLOOD PRESSURE: 126 MMHG | HEIGHT: 64 IN | DIASTOLIC BLOOD PRESSURE: 80 MMHG

## 2019-03-25 DIAGNOSIS — R93.5 ABNORMAL ULTRASOUND OF UTERUS: ICD-10-CM

## 2019-03-25 DIAGNOSIS — Z98.891 S/P CESAREAN SECTION: ICD-10-CM

## 2019-03-25 DIAGNOSIS — Q79.62 EHLERS-DANLOS, HYPERMOBILE TYPE: ICD-10-CM

## 2019-03-25 LAB
BASOPHILS # BLD AUTO: 0.04 10*3/MM3 (ref 0–0.2)
BASOPHILS NFR BLD AUTO: 0.6 % (ref 0–1.5)
EOSINOPHIL # BLD AUTO: 0.12 10*3/MM3 (ref 0–0.4)
EOSINOPHIL NFR BLD AUTO: 1.8 % (ref 0.3–6.2)
ERYTHROCYTE [DISTWIDTH] IN BLOOD BY AUTOMATED COUNT: 11.1 % (ref 12.3–15.4)
HCG INTACT+B SERPL-ACNC: <0.5 MIU/ML
HCT VFR BLD AUTO: 41.5 % (ref 34–46.6)
HGB BLD-MCNC: 13.6 G/DL (ref 12–15.9)
IMM GRANULOCYTES # BLD AUTO: 0.02 10*3/MM3 (ref 0–0.05)
IMM GRANULOCYTES NFR BLD AUTO: 0.3 % (ref 0–0.5)
LYMPHOCYTES # BLD AUTO: 2.22 10*3/MM3 (ref 0.7–3.1)
LYMPHOCYTES NFR BLD AUTO: 33.5 % (ref 19.6–45.3)
MCH RBC QN AUTO: 29.1 PG (ref 26.6–33)
MCHC RBC AUTO-ENTMCNC: 32.8 G/DL (ref 31.5–35.7)
MCV RBC AUTO: 88.7 FL (ref 79–97)
MONOCYTES # BLD AUTO: 0.54 10*3/MM3 (ref 0.1–0.9)
MONOCYTES NFR BLD AUTO: 8.2 % (ref 5–12)
NEUTROPHILS # BLD AUTO: 3.68 10*3/MM3 (ref 1.4–7)
NEUTROPHILS NFR BLD AUTO: 55.6 % (ref 42.7–76)
NRBC BLD AUTO-RTO: 0 /100 WBC (ref 0–0)
PLATELET # BLD AUTO: 281 10*3/MM3 (ref 140–450)
RBC # BLD AUTO: 4.68 10*6/MM3 (ref 3.77–5.28)
WBC # BLD AUTO: 6.62 10*3/MM3 (ref 3.4–10.8)

## 2019-03-25 PROCEDURE — 99213 OFFICE O/P EST LOW 20 MIN: CPT | Performed by: OBSTETRICS & GYNECOLOGY

## 2019-03-25 PROCEDURE — 76830 TRANSVAGINAL US NON-OB: CPT | Performed by: OBSTETRICS & GYNECOLOGY

## 2019-03-25 NOTE — PROGRESS NOTES
"      Hilary Morgan is a 28 y.o. patient who presents for follow up of   Chief Complaint   Patient presents with   • Follow-up     27 yo est pt of the practice who is new to me who presents s/p 1 LTCS on 1/29/19 for maternal EDS with congenital prolapsed rectum. She has been bleeding since then \"like a period\". She saw Ana GARAY on 3/18/19 who ordered an US. Her US today shows an 8.11 cm uterus with EL 0.94 cm and a mixed echogenic area in the endometrium that measures 1.6 x .1.0 cms. Since last week she says she stopped bleeding for 2 days and then it restarted. She feels fatigued but is not weak or dizzy.  She is breastfeeding. She denies pp depression. She is not using any contraceptives as they are [employer]. They plan to talk with their  about whether or not they can use contraceptives to help prevent pregnancy given her health issues.         The following portions of the patient's history were reviewed and updated as appropriate: allergies, current medications and problem list.    Review of Systems   Constitutional: Positive for fatigue.   Genitourinary: Positive for pelvic pain and vaginal bleeding.   Musculoskeletal: Negative for back pain.   Neurological: Negative for dizziness, syncope, weakness, light-headedness and headaches.   Psychiatric/Behavioral: Negative for dysphoric mood. The patient is not nervous/anxious.    All other systems reviewed and are negative.      /80   Ht 162.6 cm (64\")   Wt 67.1 kg (148 lb)   Breastfeeding? Yes   BMI 25.40 kg/m²     Physical Exam   Constitutional: She is oriented to person, place, and time. She appears well-developed and well-nourished.   HENT:   Head: Normocephalic and atraumatic.   Eyes: Conjunctivae are normal. No scleral icterus.   Abdominal: Soft. Bowel sounds are normal. She exhibits no distension and no mass. There is no tenderness. There is no rebound and no guarding. No hernia.   Neurological: She is alert and oriented to person, " place, and time.   Skin: Skin is warm and dry.   Psychiatric: She has a normal mood and affect. Her behavior is normal. Judgment and thought content normal.   Nursing note and vitals reviewed.    A/P  1. Excessive postpartum bleeding- check CBC and BHCG to r/o a placental site nodule. Pt has an echogenic area in the placenta that may be retained POC and it may be just a clot. Rec that since her bleeding is starting to taper down that she RTO in 2 weeks for a repeat TVUS and a visit. Bleeding warnings and parameters d/w pt.   2. CS- enc pt to consider using contraceptives as her EDS is severe and pregnancy is hard on her body.       Assessment/Plan   Hilary was seen today for follow-up.    Diagnoses and all orders for this visit:    Postpartum hemorrhage, unspecified type  -     CBC & Differential  -     HCG, B-subunit, Quantitative    Shelley-Danlos, hypermobile type    Abnormal ultrasound of uterus                   No Follow-up on file.      Gail English MD    3/25/19  9:53 PM

## 2019-03-26 PROBLEM — R93.5 ABNORMAL ULTRASOUND OF UTERUS: Status: ACTIVE | Noted: 2019-03-26

## 2019-03-26 PROBLEM — Z98.891 S/P CESAREAN SECTION: Status: ACTIVE | Noted: 2019-03-26

## 2019-03-26 PROBLEM — O21.9 NAUSEA AND VOMITING IN PREGNANCY PRIOR TO 22 WEEKS GESTATION: Status: RESOLVED | Noted: 2018-07-02 | Resolved: 2019-03-26

## 2019-03-26 PROBLEM — Z34.90 PREGNANCY: Status: RESOLVED | Noted: 2018-09-24 | Resolved: 2019-03-26

## 2019-03-26 NOTE — PROGRESS NOTES
PIP= pt is not anemic and her HGC is negative. Rec she keep her f/u appt for US and visit in 2 weeks.

## 2019-03-27 PROBLEM — N92.6 IRREGULAR UTERINE BLEEDING: Status: ACTIVE | Noted: 2019-03-27

## 2019-03-27 PROBLEM — R93.5 ABNORMAL ULTRASOUND OF UTERUS: Status: RESOLVED | Noted: 2019-03-26 | Resolved: 2019-03-27

## 2019-04-11 ENCOUNTER — PROCEDURE VISIT (OUTPATIENT)
Dept: OBSTETRICS AND GYNECOLOGY | Facility: CLINIC | Age: 29
End: 2019-04-11

## 2019-04-11 ENCOUNTER — OFFICE VISIT (OUTPATIENT)
Dept: OBSTETRICS AND GYNECOLOGY | Facility: CLINIC | Age: 29
End: 2019-04-11

## 2019-04-11 VITALS
WEIGHT: 146 LBS | SYSTOLIC BLOOD PRESSURE: 110 MMHG | DIASTOLIC BLOOD PRESSURE: 70 MMHG | HEIGHT: 64 IN | BODY MASS INDEX: 24.92 KG/M2

## 2019-04-11 DIAGNOSIS — N92.6 IRREGULAR UTERINE BLEEDING: ICD-10-CM

## 2019-04-11 DIAGNOSIS — Z30.011 ENCOUNTER FOR INITIAL PRESCRIPTION OF CONTRACEPTIVE PILLS: ICD-10-CM

## 2019-04-11 DIAGNOSIS — F42.2 MIXED OBSESSIONAL THOUGHTS AND ACTS: ICD-10-CM

## 2019-04-11 DIAGNOSIS — Q79.60 EDS (EHLERS-DANLOS SYNDROME): Primary | ICD-10-CM

## 2019-04-11 DIAGNOSIS — R21 RASH: ICD-10-CM

## 2019-04-11 DIAGNOSIS — Q79.62 EHLERS-DANLOS, HYPERMOBILE TYPE: ICD-10-CM

## 2019-04-11 PROCEDURE — 99214 OFFICE O/P EST MOD 30 MIN: CPT | Performed by: OBSTETRICS & GYNECOLOGY

## 2019-04-11 PROCEDURE — 76830 TRANSVAGINAL US NON-OB: CPT | Performed by: OBSTETRICS & GYNECOLOGY

## 2019-04-11 RX ORDER — ACETAMINOPHEN AND CODEINE PHOSPHATE 120; 12 MG/5ML; MG/5ML
1 SOLUTION ORAL DAILY
Qty: 84 TABLET | Refills: 2 | Status: SHIPPED | OUTPATIENT
Start: 2019-04-11 | End: 2020-09-15

## 2019-04-11 NOTE — PROGRESS NOTES
"      Hilary Morgan is a 28 y.o. patient who presents for follow up of   Chief Complaint   Patient presents with   • Follow-up     29 yo est pt here for TVUS and f/u of VB. She has stopped bleeding and her US today shows a 7.2 cm uterus with an EL of 0.47 and normal ovaries. The endometrial mass seen on the 3/25/19 US has resolved. She is not interested in LARC and is still breastfeeding and we discussed the options ans she wants POPs. She has developed some more OCD issues since delivery. For example, she \"freaked out\" after placing her diaper bag on the bathroom floor and is often worried about germs. We discussed counseling and meds and she does not want either at present because she thinks it may be improving over the last 2 weeks. She is not having intrusive thoughts and does not feel depressed, just more worried in general. She is able to leave the house and is not having panic attacks. She has had a rash in her arms that won't go away. She also wants to see rheumatology to discuss her EDS diagnosis.         The following portions of the patient's history were reviewed and updated as appropriate: allergies, current medications and problem list.    Review of Systems   Constitutional: Positive for fatigue. Negative for chills and fever.   Genitourinary: Negative for menstrual problem, pelvic pain and vaginal bleeding.   Musculoskeletal: Positive for arthralgias and myalgias.   Skin: Positive for rash.   Hematological: Negative for adenopathy. Does not bruise/bleed easily.   Psychiatric/Behavioral: Positive for sleep disturbance (night sweats). Negative for behavioral problems, self-injury and suicidal ideas. The patient is nervous/anxious.    All other systems reviewed and are negative.      /70   Ht 162.6 cm (64\")   Wt 66.2 kg (146 lb)   Breastfeeding? Yes   BMI 25.06 kg/m²     Physical Exam   Constitutional: She is oriented to person, place, and time. She appears well-developed and well-nourished. "   HENT:   Head: Normocephalic and atraumatic.   Eyes: Conjunctivae are normal. No scleral icterus.   Abdominal: Soft. Bowel sounds are normal. She exhibits no distension and no mass. There is no tenderness. There is no rebound and no guarding. No hernia.   Neurological: She is alert and oriented to person, place, and time.   Skin: Skin is warm and dry. Rash (fine MP rash over extensor surfaces of arms) noted.   Psychiatric: She has a normal mood and affect. Her behavior is normal. Judgment and thought content normal.   Nursing note and vitals reviewed.    A/P:  1. CS- Rx Micronor. D/w pt that it is not as effective as regular OCPS, must be taken at the same time every day, does not have a placebo week and should be coupled with another form of contraception once she weans.   2. EDS- refer rheumatology.   3. OCD- offered meds and counseling. Pt declines at present. Advised her to call for any worsening symptoms.   4. DUB- resolved.   5. Abnormal EL on US- resolved.   6. Rash- etiology unclear, refer derm.   7. RHM- RTO 7/2019 annual and recheck symptoms.       Assessment/Plan   Hilary was seen today for follow-up.    Diagnoses and all orders for this visit:    EDS (Shelley-Danlos syndrome)  -     Ambulatory Referral to Rheumatology    Rash  -     Ambulatory Referral to Dermatology    Other orders  -     norethindrone (MICRONOR) 0.35 MG tablet; Take 1 tablet by mouth Daily.                   No Follow-up on file.      Gail English MD    4/11/19  8:31 PM

## 2019-04-16 PROBLEM — O09.90 HIGH-RISK PREGNANCY: Status: RESOLVED | Noted: 2018-09-26 | Resolved: 2019-04-16

## 2019-04-16 PROBLEM — O28.3 ABNORMAL PREGNANCY US: Status: RESOLVED | Noted: 2018-09-06 | Resolved: 2019-04-16

## 2019-05-14 ENCOUNTER — TELEPHONE (OUTPATIENT)
Dept: OBSTETRICS AND GYNECOLOGY | Facility: CLINIC | Age: 29
End: 2019-05-14

## 2019-05-14 NOTE — TELEPHONE ENCOUNTER
Agreed that she needs to be seen in the ER ASAP. She could also walk into [facility] which is a mental health urgent care. I dont think they take insurance but would probably see her for an eval.

## 2019-05-14 NOTE — TELEPHONE ENCOUNTER
Per Mike, patient called upset stating she needed help with mental issues. She scheduled her with Ana next week. I called the patient and spoke with her about how she was feeling. She stated she was feeling overwhelmed and getting tired of her baby's crying. She stated that she just wanted to die and was tired of feeling the way she does. I offered the patient to go to the ER and get evaluated, she seemed a little interested. I explained to her that she was not crazy for feeling this was and that the evaluation would determine if she would benefit from an outpatient or inpatient treatment. To help her learn how to cope and deal during this time. I also gave the patient my name and number for her to call me if she changed her mind and wanted to go or if she started feeling worse and needed to talk.

## 2019-05-15 ENCOUNTER — TELEPHONE (OUTPATIENT)
Dept: OBSTETRICS AND GYNECOLOGY | Facility: CLINIC | Age: 29
End: 2019-05-15

## 2019-05-15 NOTE — TELEPHONE ENCOUNTER
Patient called and lmom stating she was feeling much better today. I called her back and spoke to her. She sounded so much better and she said she just wanted to thank me for calling her and talking to her. She is still planning on coming to her appointment tomorrow.

## 2019-05-16 ENCOUNTER — OFFICE VISIT (OUTPATIENT)
Dept: OBSTETRICS AND GYNECOLOGY | Facility: CLINIC | Age: 29
End: 2019-05-16

## 2019-05-16 VITALS
BODY MASS INDEX: 24.92 KG/M2 | HEIGHT: 64 IN | SYSTOLIC BLOOD PRESSURE: 112 MMHG | WEIGHT: 146 LBS | DIASTOLIC BLOOD PRESSURE: 74 MMHG

## 2019-05-16 DIAGNOSIS — Z98.891 S/P CESAREAN SECTION: ICD-10-CM

## 2019-05-16 DIAGNOSIS — R45.7 EMOTIONAL STRESS: Primary | ICD-10-CM

## 2019-05-16 DIAGNOSIS — M25.50 ARTHRALGIA, UNSPECIFIED JOINT: ICD-10-CM

## 2019-05-16 PROCEDURE — 99214 OFFICE O/P EST MOD 30 MIN: CPT | Performed by: NURSE PRACTITIONER

## 2019-05-28 PROBLEM — R45.7 EMOTIONAL STRESS: Status: ACTIVE | Noted: 2019-05-28

## 2019-05-28 PROBLEM — M25.50 ARTHRALGIA: Status: ACTIVE | Noted: 2019-05-28

## 2019-05-28 NOTE — PROGRESS NOTES
"Subjective     Chief Complaint   Patient presents with   • Personal Problem     Mental health issues       Hilary Morgan is a 28 y.o.  whose LMP is No LMP recorded.. She presents with complaints of feeling \"not like myself\" since having the baby. She is S/P PLTCS 19. She has never had an official diagnosis of depression or anxiety but she feels like she has struggled some with it over her life. She reports that she has no energy, feels sad sometimes, cries sometimes, and feels overwhelmed. On occas, she feels like her family would be \"better off without me.\" She states she does not want to harm herself or take her life but has thought about leaving her family and moving away by herself. She reports she is under a lot of stress. Her  works in law enforcement and is having to increase his patrol time which causes her a lot of worry. She has a new dx of Ehler's Danlos that was found during her recent pregnancy. She can not find a physician that specializes in ED and has not had any follow up. She has trouble with her knees at present. States she \"pops my knee out\" and is very painful. She has not seen ortho and has not had physical therapy. They are in the process of moving as well. She is tearful when discussing. She is breastfeeding. Her  has accompanied her to the visit today.     HPI    HPI    The following portions of the patient's history were reviewed and updated as appropriate:vital signs, allergies, current medications, past medical history, past social history, past surgical history and problem list      Review of Systems     Review of Systems   Constitutional: Positive for fatigue.   Respiratory: Negative.    Cardiovascular: Negative.    Gastrointestinal: Negative.    Endocrine: Negative.    Genitourinary: Negative.    Musculoskeletal: Positive for arthralgias.   Skin: Negative.    Neurological: Negative.    Psychiatric/Behavioral: Negative for self-injury, sleep disturbance and " "suicidal ideas. The patient is nervous/anxious.         Feels overwhelmed, cries frequently, feels sad          Objective      /74   Ht 162.6 cm (64\")   Wt 66.2 kg (146 lb)   BMI 25.06 kg/m²     Physical Exam    Physical Exam   Constitutional: She is oriented to person, place, and time. She appears well-developed and well-nourished.   Abdominal: Soft. She exhibits no distension.   Musculoskeletal: Normal range of motion.   Neurological: She is alert and oriented to person, place, and time.   Skin: Skin is warm and dry.   Psychiatric: She has a normal mood and affect. Her behavior is normal. Thought content normal.   Vitals reviewed.      Lab Review   Labs: NA    Imaging   No data reviewed    Assessment  There are no diagnoses linked to this encounter.    Additional Assessment:   1) Emotional stress   2) Breastfeeding   3) Joint pain     Plan     1. Emotional stress- Disc at length postpartum depression and anxiety. Disc options of counseling and/or medication therapy. Her  does not wish for her to have therapy d/t a negative experience he has had in the past with counseling. Enc  to be open minded regarding counseling/therapy and allow the patient to formulate an opinion for herself by seeking counseling services. He is in agreement with allowing her to trial counseling. Disc antidepressant use with breastfeeding. Information provided on use with breastfeeding. Disc with patient that she is experiencing several major life events in less than one year and her feelings of being overwhelmed are validated. Information provided on counseling services offered locally as well. Enc patient to contact her insurance company to learn providers that will take her insurance. Rev worsening s/s.     2. Breast feeding- Taking PNV    3. Joint pain- Recurrent pain and dislocation with her knee per her report. Ref to ortho.     4. Scheduled for: STD Labs - N/A , Ultrasound of the -  N/A , Mammography - N/A , " Bone Density Test - N/A , Additional Labs - N/A    5. STD:  Enc condom use.     6. Smoking status: non smoker    7.   BMI Status: Patient's Body mass index is 25.06 kg/m². BMI is within normal parameters. No follow-up required..    RTO PRN     Counseling was given to patient and family for the following topics: diagnostic results, instructions for management, risk factor reductions, prognosis, patient and family education, impressions, risks and benefits of treatment options and importance of treatment compliance . Total time of the encounter was 30 minutes and 20 minutes was spend counseling.      Ana Carreon, APRN  5/16/19

## 2019-07-26 ENCOUNTER — HOSPITAL ENCOUNTER (EMERGENCY)
Facility: HOSPITAL | Age: 29
Discharge: HOME | End: 2019-07-26
Attending: EMERGENCY MEDICINE | Admitting: EMERGENCY MEDICINE

## 2019-07-26 VITALS
SYSTOLIC BLOOD PRESSURE: 109 MMHG | RESPIRATION RATE: 15 BRPM | HEIGHT: 62 IN | DIASTOLIC BLOOD PRESSURE: 65 MMHG | TEMPERATURE: 98.2 F | OXYGEN SATURATION: 98 % | WEIGHT: 140 LBS | BODY MASS INDEX: 25.76 KG/M2 | HEART RATE: 70 BPM

## 2019-07-26 DIAGNOSIS — E86.9 VOLUME DEPLETION: ICD-10-CM

## 2019-07-26 DIAGNOSIS — R11.0 NAUSEA: Primary | ICD-10-CM

## 2019-07-26 LAB
ALBUMIN SERPL-MCNC: 3.8 G/DL (ref 3.5–5.2)
ALBUMIN/GLOB SERPL: 1.3 G/DL
ALP SERPL-CCNC: 64 U/L (ref 39–117)
ALT SERPL W P-5'-P-CCNC: 10 U/L (ref 1–33)
ANION GAP SERPL CALCULATED.3IONS-SCNC: 10.3 MMOL/L (ref 5–15)
AST SERPL-CCNC: 20 U/L (ref 1–32)
B-HCG UR QL: NEGATIVE
BACTERIA UR QL AUTO: ABNORMAL /HPF
BASOPHILS # BLD AUTO: 0.01 10*3/MM3 (ref 0–0.2)
BASOPHILS NFR BLD AUTO: 0.2 % (ref 0–1.5)
BILIRUB SERPL-MCNC: 0.6 MG/DL (ref 0.2–1.2)
BILIRUB UR QL STRIP: NEGATIVE
BUN BLD-MCNC: 12 MG/DL (ref 6–20)
BUN/CREAT SERPL: 14.8 (ref 7–25)
CALCIUM SPEC-SCNC: 8.8 MG/DL (ref 8.6–10.5)
CHLORIDE SERPL-SCNC: 100 MMOL/L (ref 98–107)
CLARITY UR: ABNORMAL
CO2 SERPL-SCNC: 27.7 MMOL/L (ref 22–29)
COLOR UR: ABNORMAL
CREAT BLD-MCNC: 0.81 MG/DL (ref 0.57–1)
DEPRECATED RDW RBC AUTO: 36.5 FL (ref 37–54)
EOSINOPHIL # BLD AUTO: 0.04 10*3/MM3 (ref 0–0.4)
EOSINOPHIL NFR BLD AUTO: 0.9 % (ref 0.3–6.2)
ERYTHROCYTE [DISTWIDTH] IN BLOOD BY AUTOMATED COUNT: 11.6 % (ref 12.3–15.4)
GFR SERPL CREATININE-BSD FRML MDRD: 85 ML/MIN/1.73
GLOBULIN UR ELPH-MCNC: 2.9 GM/DL
GLUCOSE BLD-MCNC: 88 MG/DL (ref 65–99)
GLUCOSE UR STRIP-MCNC: NEGATIVE MG/DL
HCT VFR BLD AUTO: 37.1 % (ref 34–46.6)
HGB BLD-MCNC: 13.1 G/DL (ref 12–15.9)
HGB UR QL STRIP.AUTO: ABNORMAL
HYALINE CASTS UR QL AUTO: ABNORMAL /LPF
IMM GRANULOCYTES # BLD AUTO: 0.01 10*3/MM3 (ref 0–0.05)
IMM GRANULOCYTES NFR BLD AUTO: 0.2 % (ref 0–0.5)
KETONES UR QL STRIP: NEGATIVE
LEUKOCYTE ESTERASE UR QL STRIP.AUTO: NEGATIVE
LIPASE SERPL-CCNC: 24 U/L (ref 13–60)
LYMPHOCYTES # BLD AUTO: 1.29 10*3/MM3 (ref 0.7–3.1)
LYMPHOCYTES NFR BLD AUTO: 27.7 % (ref 19.6–45.3)
MCH RBC QN AUTO: 30.3 PG (ref 26.6–33)
MCHC RBC AUTO-ENTMCNC: 35.3 G/DL (ref 31.5–35.7)
MCV RBC AUTO: 85.7 FL (ref 79–97)
MONOCYTES # BLD AUTO: 0.65 10*3/MM3 (ref 0.1–0.9)
MONOCYTES NFR BLD AUTO: 13.9 % (ref 5–12)
NEUTROPHILS # BLD AUTO: 2.66 10*3/MM3 (ref 1.7–7)
NEUTROPHILS NFR BLD AUTO: 57.1 % (ref 42.7–76)
NITRITE UR QL STRIP: NEGATIVE
NRBC BLD AUTO-RTO: 0 /100 WBC (ref 0–0.2)
PH UR STRIP.AUTO: 7.5 [PH] (ref 4.5–8)
PLATELET # BLD AUTO: 210 10*3/MM3 (ref 140–450)
PMV BLD AUTO: 9.7 FL (ref 6–12)
POTASSIUM BLD-SCNC: 3.9 MMOL/L (ref 3.5–5.2)
PROT SERPL-MCNC: 6.7 G/DL (ref 6–8.5)
PROT UR QL STRIP: ABNORMAL
RBC # BLD AUTO: 4.33 10*6/MM3 (ref 3.77–5.28)
RBC # UR: ABNORMAL /HPF
REF LAB TEST METHOD: ABNORMAL
SODIUM BLD-SCNC: 138 MMOL/L (ref 136–145)
SP GR UR STRIP: 1.01 (ref 1–1.03)
SQUAMOUS #/AREA URNS HPF: ABNORMAL /HPF
UROBILINOGEN UR QL STRIP: ABNORMAL
WBC NRBC COR # BLD: 4.66 10*3/MM3 (ref 3.4–10.8)
WBC UR QL AUTO: ABNORMAL /HPF

## 2019-07-26 PROCEDURE — 81025 URINE PREGNANCY TEST: CPT | Performed by: EMERGENCY MEDICINE

## 2019-07-26 PROCEDURE — 99283 EMERGENCY DEPT VISIT LOW MDM: CPT

## 2019-07-26 PROCEDURE — 25010000002 ONDANSETRON PER 1 MG: Performed by: EMERGENCY MEDICINE

## 2019-07-26 PROCEDURE — 96374 THER/PROPH/DIAG INJ IV PUSH: CPT

## 2019-07-26 PROCEDURE — 85025 COMPLETE CBC W/AUTO DIFF WBC: CPT | Performed by: EMERGENCY MEDICINE

## 2019-07-26 PROCEDURE — 81001 URINALYSIS AUTO W/SCOPE: CPT | Performed by: EMERGENCY MEDICINE

## 2019-07-26 PROCEDURE — 99282 EMERGENCY DEPT VISIT SF MDM: CPT | Performed by: EMERGENCY MEDICINE

## 2019-07-26 PROCEDURE — 83690 ASSAY OF LIPASE: CPT | Performed by: EMERGENCY MEDICINE

## 2019-07-26 PROCEDURE — 80053 COMPREHEN METABOLIC PANEL: CPT | Performed by: EMERGENCY MEDICINE

## 2019-07-26 RX ORDER — SODIUM CHLORIDE 0.9 % (FLUSH) 0.9 %
10 SYRINGE (ML) INJECTION AS NEEDED
Status: DISCONTINUED | OUTPATIENT
Start: 2019-07-26 | End: 2019-07-26 | Stop reason: HOSPADM

## 2019-07-26 RX ORDER — ONDANSETRON 2 MG/ML
4 INJECTION INTRAMUSCULAR; INTRAVENOUS ONCE
Status: COMPLETED | OUTPATIENT
Start: 2019-07-26 | End: 2019-07-26

## 2019-07-26 RX ORDER — ONDANSETRON 4 MG/1
4 TABLET, ORALLY DISINTEGRATING ORAL EVERY 8 HOURS PRN
COMMUNITY
End: 2020-09-15

## 2019-07-26 RX ADMIN — ONDANSETRON 4 MG: 2 INJECTION, SOLUTION INTRAMUSCULAR; INTRAVENOUS at 14:42

## 2019-07-26 RX ADMIN — SODIUM CHLORIDE 1000 ML: 9 INJECTION, SOLUTION INTRAVENOUS at 14:42

## 2020-01-16 ENCOUNTER — OFFICE VISIT (OUTPATIENT)
Dept: OBSTETRICS AND GYNECOLOGY | Facility: CLINIC | Age: 30
End: 2020-01-16

## 2020-01-16 VITALS
DIASTOLIC BLOOD PRESSURE: 78 MMHG | SYSTOLIC BLOOD PRESSURE: 120 MMHG | WEIGHT: 151 LBS | BODY MASS INDEX: 27.79 KG/M2 | HEIGHT: 62 IN

## 2020-01-16 DIAGNOSIS — Z13.9 SCREENING FOR CONDITION: ICD-10-CM

## 2020-01-16 DIAGNOSIS — R45.86 MOOD CHANGES: Primary | ICD-10-CM

## 2020-01-16 DIAGNOSIS — Z72.51 UNPROTECTED SEX: ICD-10-CM

## 2020-01-16 LAB
B-HCG UR QL: NEGATIVE
BILIRUB BLD-MCNC: NEGATIVE MG/DL
CLARITY, POC: CLEAR
COLOR UR: YELLOW
GLUCOSE UR STRIP-MCNC: NEGATIVE MG/DL
INTERNAL NEGATIVE CONTROL: NEGATIVE
INTERNAL POSITIVE CONTROL: POSITIVE
KETONES UR QL: NEGATIVE
LEUKOCYTE EST, POC: NEGATIVE
Lab: 55
NITRITE UR-MCNC: NEGATIVE MG/ML
PH UR: 5 [PH] (ref 5–8)
PROT UR STRIP-MCNC: NEGATIVE MG/DL
RBC # UR STRIP: NEGATIVE /UL
SP GR UR: 1 (ref 1–1.03)
UROBILINOGEN UR QL: NORMAL

## 2020-01-16 PROCEDURE — 99214 OFFICE O/P EST MOD 30 MIN: CPT | Performed by: NURSE PRACTITIONER

## 2020-01-16 PROCEDURE — 81002 URINALYSIS NONAUTO W/O SCOPE: CPT | Performed by: NURSE PRACTITIONER

## 2020-01-16 PROCEDURE — 81025 URINE PREGNANCY TEST: CPT | Performed by: NURSE PRACTITIONER

## 2020-01-17 LAB
HCG INTACT+B SERPL-ACNC: <1 MIU/ML
TSH SERPL DL<=0.005 MIU/L-ACNC: 1.56 UIU/ML (ref 0.45–4.5)

## 2020-01-27 PROBLEM — R45.86 MOOD CHANGES: Status: ACTIVE | Noted: 2020-01-27

## 2020-01-27 NOTE — PROGRESS NOTES
Subjective     Chief Complaint   Patient presents with   • Consult     pt therapist suggest she come in to consult regarding irritability before cycle       Hilary Morgan is a 29 y.o.  whose LMP is Patient's last menstrual period was 2020 (exact date)..She is an established patient.  She presents today to discuss how she has been feeling recently.  She is status post primary  due to Erler's Danlos in 2019. She is accompanied by her child today. She is in therapy and her therapist suggested she follow-up with her OB/GYN due to her complaints.  She states that she feels miserable the week or 2 leading up to her period.  She states that she is not a nice person during this time.  She feels overwhelmed and easily angered.  She reports she is irritable as well.  She does not like how she feels during this time.  She states she only usually has about 1 good week out of the month.  She is not on any form of contraception.  She wants a pregnancy in the future but not at the present time however she is having unprotected sexual intercourse.  She is struggling with pain due to her joint hypermobility.  She is having trouble finding a primary care doctor or any physician that has experience or good knowledge about her Shelley-Danlos. She is emotional and tearful during her discussion.  She reports she has no suicidal or homicidal ideation. This problem is new to me, the examiner.       HPI    HPI    The following portions of the patient's history were reviewed and updated as appropriate:vital signs, allergies, current medications, past medical history, past social history, past surgical history and problem list      Review of Systems     Review of Systems   Constitutional: Positive for fatigue.   Genitourinary: Negative for pelvic pain and vaginal bleeding.   Musculoskeletal:        Joint pain     Psychiatric/Behavioral: Positive for agitation, decreased concentration, sleep disturbance and  "depressed mood. Negative for self-injury and suicidal ideas.       Objective      /78   Ht 157.4 cm (61.97\")   Wt 68.5 kg (151 lb)   LMP 2020 (Exact Date)   Breastfeeding No   BMI 27.65 kg/m²     Physical Exam    Physical Exam   Constitutional: She is oriented to person, place, and time. She appears well-developed and well-nourished.   Pulmonary/Chest: Effort normal.   Musculoskeletal: Normal range of motion. She exhibits no edema.   Neurological: She is alert and oriented to person, place, and time.   Skin: Skin is warm and dry.   Psychiatric: She has a normal mood and affect. Her behavior is normal.   Vitals reviewed.      Lab Review   Labs: Urine pregnancy test, Urinalysis - with micro     Imaging   No data reviewed    Assessment  Hilary was seen today for consult.    Diagnoses and all orders for this visit:    Screening for condition  -     POC Urinalysis Dipstick  -     POC Pregnancy, Urine    Unprotected sex  -     HCG, B-subunit, Quantitative    Anxiety  -     TSH Rfx On Abnormal To Free T4      Additional Assessment:   1. Mood changes   2. Unprotected SIC    Plan     1. Mood changes- Pt is currently in therapy weekly. She is not on any medication for depression or anxiety. Disc PMDD vs. Depression and anxiety. She has had a new diagnosis, a major surgery () and new child all within the past approx 1 year. Disc these are life changing events and she could have an underlying depression and anxiety. Disc that the mood changes leading up to her cycle were indicative of PMDD. Disc and options of PMDD including birth control pills and antidepressant.  Offered [facility] or [facility] for a thorough evaluation.  She is willing to do the face-to-face eval with telehealth and the [facility].  The patient was taken to [facility] and the [facility] evaluation was scheduled with the patient.  2. Unprotected intercourse-check quant hCG.  Plan to start OCPs if quant is negative.    RTO PRN or sooner " based on the recommendation from [facility]     Counseling was given to patient for the following topics: patient and family education, impressions and risks and benefits of treatment options . Total time of the encounter was 30 minutes and 20 minutes was spend counseling.      Ana Carreon, APRN  1/16/20

## 2020-08-05 ENCOUNTER — HOSPITAL ENCOUNTER (EMERGENCY)
Facility: HOSPITAL | Age: 30
Discharge: LEFT BEFORE DISCHARGE | End: 2020-08-05
Attending: EMERGENCY MEDICINE

## 2020-08-05 VITALS
WEIGHT: 150 LBS | HEART RATE: 74 BPM | BODY MASS INDEX: 27.6 KG/M2 | TEMPERATURE: 98.3 F | OXYGEN SATURATION: 99 % | DIASTOLIC BLOOD PRESSURE: 80 MMHG | HEIGHT: 62 IN | SYSTOLIC BLOOD PRESSURE: 123 MMHG | RESPIRATION RATE: 16 BRPM

## 2020-08-05 NOTE — ED NOTES
The patients  came in and asked to speak to the charge nurse. Stephy SWAIN called and came out to speak to the      Maco Mcgrath RN  08/05/20 [id number]

## 2020-08-05 NOTE — ED NOTES
"Pt got upset when I asked her if she had called her OB/GYN MD. She told me \"no they are closed\". I said \" just for future reference if you call there is always someone on call that will call you and give you advice over the phone\". The patient appeared upset that I told her this and started answering all my questions with a sharp \"yes or no answer\" and acted very indignant towards me. When I told her that we did not have any ED beds open at the moment and that we have the patients wait in their cars until a bed becomes available. The patient went \" hmph, I will just leave and call and call my OB then. I said that you are welcome to do that or be seen. She said that she was leaving     Maco Mcgrath RN  08/05/20 [id number]    "

## 2020-08-05 NOTE — ED NOTES
"Called patient to come back to room 6.  Patient  States that she talked to her OB on the phone and they advised her to not be seen in the ed due to low possibility of getting an ultra sound in the ED. \"and that is what I want\".  States that she is going to go home and see her OB in the am and get an ultrasound.  Encouraged patient to return to the ED if needed.  Voices understanding.     Stephy Berg RN  08/05/20 [id number]    "

## 2020-08-06 ENCOUNTER — OFFICE VISIT (OUTPATIENT)
Dept: OBSTETRICS AND GYNECOLOGY | Facility: CLINIC | Age: 30
End: 2020-08-06

## 2020-08-06 ENCOUNTER — PROCEDURE VISIT (OUTPATIENT)
Dept: OBSTETRICS AND GYNECOLOGY | Facility: CLINIC | Age: 30
End: 2020-08-06

## 2020-08-06 VITALS
BODY MASS INDEX: 26.32 KG/M2 | SYSTOLIC BLOOD PRESSURE: 140 MMHG | DIASTOLIC BLOOD PRESSURE: 80 MMHG | HEIGHT: 64 IN | WEIGHT: 154.2 LBS

## 2020-08-06 DIAGNOSIS — Z36.9 ENCOUNTER FOR ANTENATAL SCREENING, UNSPECIFIED: ICD-10-CM

## 2020-08-06 DIAGNOSIS — O03.9 SAB (SPONTANEOUS ABORTION): Primary | ICD-10-CM

## 2020-08-06 DIAGNOSIS — Z13.9 SCREENING FOR CONDITION: ICD-10-CM

## 2020-08-06 DIAGNOSIS — O20.0 THREATENED ABORTION: Primary | ICD-10-CM

## 2020-08-06 LAB
B-HCG UR QL: POSITIVE
BILIRUB BLD-MCNC: NEGATIVE MG/DL
CLARITY, POC: CLEAR
COLOR UR: YELLOW
GLUCOSE UR STRIP-MCNC: NEGATIVE MG/DL
HCG INTACT+B SERPL-ACNC: 510.2 MIU/ML
INTERNAL NEGATIVE CONTROL: NEGATIVE
INTERNAL POSITIVE CONTROL: POSITIVE
KETONES UR QL: ABNORMAL
LEUKOCYTE EST, POC: NEGATIVE
Lab: 55
NITRITE UR-MCNC: NEGATIVE MG/ML
PH UR: 5 [PH] (ref 5–8)
PROT UR STRIP-MCNC: ABNORMAL MG/DL
RBC # UR STRIP: ABNORMAL /UL
SP GR UR: 1 (ref 1–1.03)
UROBILINOGEN UR QL: NORMAL

## 2020-08-06 PROCEDURE — 81025 URINE PREGNANCY TEST: CPT | Performed by: NURSE PRACTITIONER

## 2020-08-06 PROCEDURE — 99214 OFFICE O/P EST MOD 30 MIN: CPT | Performed by: NURSE PRACTITIONER

## 2020-08-06 PROCEDURE — 76817 TRANSVAGINAL US OBSTETRIC: CPT | Performed by: NURSE PRACTITIONER

## 2020-08-06 NOTE — PROGRESS NOTES
"Subjective     Chief Complaint   Patient presents with   • Confirmation       Hilary Morgan is a 29 y.o.  whose LMP is Patient's last menstrual period was 2020 (exact date).. She presents with complaints of bleeding in early pregnancy. She learned she was pregnant 2 weeks ago. She was not trying to prevent pregnancy but not really seeking it either. She is confident with her LNMP and reports a history of normal cycles over the last few months.  She tested for pregnancy because she skipped her cycle in July. She reports she had symptoms of pregnancy including nausea, vomiting and fatigue. She started spotting approx 1 week ago. She describes she \"only had a few drops\" of blood in her underwear. She did notice some heavy vaginal discharge that was white to clear in color. Yesterday, she started spotting dark brown blood. As the day progressed into last evening, she report her bleeding intensified to bright red blood and passing of clots and having intense cramping. She reports her bleeding has continued today but her pain has resolved. Today her bleeding is dark brown and she denies pain.  She is tearful.  This problem is new to me, the examiner.     HPI    HPI    The following portions of the patient's history were reviewed and updated as appropriate:vital signs, allergies, current medications, past medical history, past social history, past surgical history and problem list      Review of Systems     Review of Systems   Constitutional: Positive for fatigue.   Respiratory: Negative.    Cardiovascular: Negative.    Gastrointestinal: Negative.    Genitourinary: Positive for vaginal bleeding. Negative for vaginal pain.   Musculoskeletal: Negative.  Negative for back pain.   Skin: Negative.    Neurological: Negative.    Psychiatric/Behavioral: Negative.        Objective      /80   Ht 162.6 cm (64\")   Wt 69.9 kg (154 lb 3.2 oz)   LMP 2020 (Exact Date) Comment: Pt is bleeding clots  " Breastfeeding No   BMI 26.47 kg/m²     Physical Exam    Physical Exam   Constitutional: She is oriented to person, place, and time. She appears well-developed and well-nourished.   Pulmonary/Chest: Effort normal. No respiratory distress.   Abdominal: Soft.   Musculoskeletal: Normal range of motion. She exhibits no edema.   Neurological: She is alert and oriented to person, place, and time.   Skin: Skin is warm and dry.   Psychiatric: She has a normal mood and affect. Her behavior is normal.   Vitals reviewed.      Lab Review   Labs: urine pregnancy test     Imaging   Ultrasound - Pelvic Vaginal. US IMP: NO IUP SEEN. ANTEVERTED UTERUS. ENDO= .5 CM, SMOOTH APPEARING. NL RT OV. NL LT OV. NO FREE FLUID    Assessment  Hilary was seen today for confirmation.    Diagnoses and all orders for this visit:    Screening for condition  -     POC Urinalysis Dipstick  -     POC Pregnancy, Urine    Encounter for  screening, unspecified  -     HCG, B-subunit, Quantitative        Additional Assessment:   1. Spontaneous     Plan     1. Spontaneous - Rh +.  Had a positive urine pregnancy test in the office today.  Ultrasound results discussed with patient.  She understands that there is no IUP seen in the uterus today.  She did have an episode of heavy bleeding and severe pain last night with passing of clots as well.  She understands that that most likely was a miscarriage.  Plan to check quant levels today and repeat again on Monday.  We did discuss that if her quant proved to be falling that we can confirm she did have a spontaneous .  She understands that her quant levels will need to be followed to 0.  She has questions regarding when she can try for conception again.  Was recommended that once her quant levels get down to 0 she had a couple normal cycles prior to trying for pregnancy again.  Recommend she continue her prenatal vitamins daily.  Reviewed warning signs and symptoms of when she  should call in the office with concerns.  2.   BMI: Patient's Body mass index is 26.47 kg/m². BMI is above normal parameters. Recommendations include: none.    RTO on Monday for repeat quant    Counseling was given to patient for the following topics: diagnostic results, risk factor reductions, prognosis, patient and family education and impressions . Total time of the encounter was 25 minutes and 20 minutes was spend counseling.    Ana Carreon, SIRENA  8/6/2020

## 2020-08-12 ENCOUNTER — PROCEDURE VISIT (OUTPATIENT)
Dept: OBSTETRICS AND GYNECOLOGY | Facility: CLINIC | Age: 30
End: 2020-08-12

## 2020-08-12 ENCOUNTER — OFFICE VISIT (OUTPATIENT)
Dept: OBSTETRICS AND GYNECOLOGY | Facility: CLINIC | Age: 30
End: 2020-08-12

## 2020-08-12 VITALS
HEIGHT: 64 IN | WEIGHT: 153.5 LBS | DIASTOLIC BLOOD PRESSURE: 80 MMHG | BODY MASS INDEX: 26.21 KG/M2 | SYSTOLIC BLOOD PRESSURE: 126 MMHG

## 2020-08-12 DIAGNOSIS — Z13.9 SCREENING FOR CONDITION: ICD-10-CM

## 2020-08-12 DIAGNOSIS — O03.9 SAB (SPONTANEOUS ABORTION): Primary | ICD-10-CM

## 2020-08-12 DIAGNOSIS — O02.81 INAPPROPRIATE LEVEL OF QUANTITATIVE HCG FOR GESTATIONAL AGE IN EARLY PREGNANCY: Primary | ICD-10-CM

## 2020-08-12 DIAGNOSIS — Z34.90 EARLY STAGE OF PREGNANCY: ICD-10-CM

## 2020-08-12 LAB
BILIRUB BLD-MCNC: NEGATIVE MG/DL
CLARITY, POC: CLEAR
COLOR UR: YELLOW
GLUCOSE UR STRIP-MCNC: NEGATIVE MG/DL
KETONES UR QL: NEGATIVE
LEUKOCYTE EST, POC: NEGATIVE
NITRITE UR-MCNC: NEGATIVE MG/ML
PH UR: 5 [PH] (ref 5–8)
PROT UR STRIP-MCNC: NEGATIVE MG/DL
RBC # UR STRIP: NEGATIVE /UL
SP GR UR: 1 (ref 1–1.03)
UROBILINOGEN UR QL: NORMAL

## 2020-08-12 PROCEDURE — 76817 TRANSVAGINAL US OBSTETRIC: CPT | Performed by: NURSE PRACTITIONER

## 2020-08-12 PROCEDURE — 99213 OFFICE O/P EST LOW 20 MIN: CPT | Performed by: NURSE PRACTITIONER

## 2020-08-12 NOTE — PROGRESS NOTES
"Subjective     Chief Complaint   Patient presents with   • Follow-up     Labs       Hilary Morgan is a 29 y.o.  whose LMP is Patient's last menstrual period was 2020 (exact date). She presents today for follow up. She had a + UPT at home several weeks ago. Last week she had heavy vaginal bleeding with passing of clots and was assumed to have a SAB. She had an US in the office last week that indicated no IUP and a EL of 0.5cm. Her quant level this day was 510. Her repeat quant HCG approx 5 days later, her quant was 612. She is having a repeat quant and US today. She has been given precautions for ectopic pregnancy. She reports this past weekend she had another episode of severe pain with heavy bleeding and passing of clots. She reports this episode lasted approx 10-15 minutes. She denies pain today. She has mild spotting that is dark in color.     HPI    HPI    The following portions of the patient's history were reviewed and updated as appropriate:vital signs, allergies, current medications, past medical history, past social history, past surgical history and problem list      Review of Systems     Review of Systems   Constitutional: Positive for fatigue.   Gastrointestinal: Negative for abdominal distention, abdominal pain, nausea and vomiting.   Genitourinary: Positive for vaginal bleeding. Negative for pelvic pressure.       Objective      /80   Ht 162.6 cm (64.02\")   Wt 69.6 kg (153 lb 8 oz)   LMP 2020 (Exact Date)   Breastfeeding No   BMI 26.34 kg/m²     Physical Exam    Physical Exam   Constitutional: She is oriented to person, place, and time. She appears well-developed and well-nourished.   Pulmonary/Chest: Effort normal. No respiratory distress.   Abdominal: Soft. She exhibits no distension. There is no tenderness.   Musculoskeletal: Normal range of motion. She exhibits no edema.   Neurological: She is alert and oriented to person, place, and time.   Skin: Skin is warm and " dry.   Psychiatric: She has a normal mood and affect. Her behavior is normal.   Vitals reviewed.      Lab Review   Labs: Urinalysis - with micro     Imaging   US IMP: ANTEVERTED UTERUS. ENDO= .3 CM, SMOOTH APPEARING. NL RT OV. NL LT OV. NO FREE FLUID. NO IUP SEEN. NL APPEARING ADNEXA. NO ECTOPIC SEEN    US, labs, and patient status disc with Dr. Rocha and Dr. Duong. Plan to recheck quant today and given ectopic pregnancy warn s/s.     Assessment  Hilary was seen today for follow-up.    Diagnoses and all orders for this visit:    Screening for condition  -     POC Urinalysis Dipstick        Additional Assessment:   1. Bleeding in early pregnancy     Plan     1. Bleeding in early pregnancy- Disc labs and US with patient. Check quant today. Rev warn s/s of ectopic pregnancy. Plan repeat US in 1 week or sooner PRN  2. Scheduled for: STD Labs - N/A , Ultrasound of the -  PELVIC , Mammography - N/A , Bone Density Test - N/A , Additional Labs - QUANT HCG  3. STD:  Enc condom use.   4. Smoking status: non smoker  5.   BMI: Patient's Body mass index is 26.34 kg/m². BMI is above normal parameters. Recommendations include: none.    RTO 1 week for TVUS     Ana Carreon, SIRENA  8/12/2020

## 2020-08-13 ENCOUNTER — TELEPHONE (OUTPATIENT)
Dept: OBSTETRICS AND GYNECOLOGY | Facility: CLINIC | Age: 30
End: 2020-08-13

## 2020-08-13 LAB — HCG INTACT+B SERPL-ACNC: 681 MIU/ML

## 2020-08-13 NOTE — TELEPHONE ENCOUNTER
DIsc quants and US findings with Dr. English and Dr. Gonsalez.     Phone call placed to patient to disc her quant HCG. Disc quants are concerning for ectopic pregnancy. She has previously been given warn s/s. She is not in pain but continues to have some vaginal bleeding. Rec that patient have methotrexate as an outpatient with [facility]. Disc methotrexate and how the medication works to help resolve a possible ectopic pregnancy. Also disc possible side effects. Also disc this medication could prevent her from having an emergent procedure for a ruptured ectopic pregnancy in the future.  Pt declines at this time. SHe is asking if she can be ref to MFM for a consult. Disc that MFM will not see her for this is considered a GYN problem at this time. Pt states she will talk to her  and consider methotrexate. She will call the office if she desires to proceed. Again reinforced warn s/s of ectopic pregnancy. At this time she has repeat US and quant beginning of next week.

## 2020-08-18 ENCOUNTER — OFFICE VISIT (OUTPATIENT)
Dept: OBSTETRICS AND GYNECOLOGY | Facility: CLINIC | Age: 30
End: 2020-08-18

## 2020-08-18 ENCOUNTER — PROCEDURE VISIT (OUTPATIENT)
Dept: OBSTETRICS AND GYNECOLOGY | Facility: CLINIC | Age: 30
End: 2020-08-18

## 2020-08-18 ENCOUNTER — HOSPITAL ENCOUNTER (OUTPATIENT)
Dept: INFUSION THERAPY | Facility: HOSPITAL | Age: 30
Discharge: HOME | End: 2020-08-18
Admitting: NURSE PRACTITIONER

## 2020-08-18 VITALS
HEIGHT: 64 IN | BODY MASS INDEX: 26.67 KG/M2 | DIASTOLIC BLOOD PRESSURE: 88 MMHG | SYSTOLIC BLOOD PRESSURE: 120 MMHG | WEIGHT: 156.2 LBS

## 2020-08-18 VITALS
SYSTOLIC BLOOD PRESSURE: 114 MMHG | HEART RATE: 98 BPM | OXYGEN SATURATION: 100 % | DIASTOLIC BLOOD PRESSURE: 72 MMHG | TEMPERATURE: 98.2 F | RESPIRATION RATE: 18 BRPM

## 2020-08-18 DIAGNOSIS — O00.90 ECTOPIC PREGNANCY WITHOUT INTRAUTERINE PREGNANCY, UNSPECIFIED LOCATION: Primary | ICD-10-CM

## 2020-08-18 DIAGNOSIS — Z13.9 SCREENING FOR CONDITION: Primary | ICD-10-CM

## 2020-08-18 DIAGNOSIS — O02.81 INAPPROPRIATE LEVEL OF QUANTITATIVE HCG FOR GESTATIONAL AGE IN EARLY PREGNANCY: Primary | ICD-10-CM

## 2020-08-18 DIAGNOSIS — O00.201 RIGHT OVARIAN PREGNANCY WITHOUT INTRAUTERINE PREGNANCY: ICD-10-CM

## 2020-08-18 DIAGNOSIS — O02.81 INAPPROPRIATE LEVEL OF QUANTITATIVE HCG FOR GESTATIONAL AGE IN EARLY PREGNANCY: ICD-10-CM

## 2020-08-18 LAB
ALBUMIN SERPL-MCNC: 4.3 G/DL (ref 3.5–5.2)
ALBUMIN/GLOB SERPL: 1.9 G/DL
ALP SERPL-CCNC: 49 U/L (ref 39–117)
ALT SERPL W P-5'-P-CCNC: 17 U/L (ref 1–33)
ANION GAP SERPL CALCULATED.3IONS-SCNC: 9.6 MMOL/L (ref 5–15)
AST SERPL-CCNC: 18 U/L (ref 1–32)
BASOPHILS # BLD AUTO: 0.03 10*3/MM3 (ref 0–0.2)
BASOPHILS NFR BLD AUTO: 0.4 % (ref 0–1.5)
BILIRUB SERPL-MCNC: 0.5 MG/DL (ref 0–1.2)
BUN SERPL-MCNC: 10 MG/DL (ref 6–20)
BUN/CREAT SERPL: 12.2 (ref 7–25)
CALCIUM SPEC-SCNC: 9.1 MG/DL (ref 8.6–10.5)
CHLORIDE SERPL-SCNC: 105 MMOL/L (ref 98–107)
CO2 SERPL-SCNC: 25.4 MMOL/L (ref 22–29)
CREAT SERPL-MCNC: 0.82 MG/DL (ref 0.57–1)
DEPRECATED RDW RBC AUTO: 42 FL (ref 37–54)
EOSINOPHIL # BLD AUTO: 0.06 10*3/MM3 (ref 0–0.4)
EOSINOPHIL NFR BLD AUTO: 0.8 % (ref 0.3–6.2)
ERYTHROCYTE [DISTWIDTH] IN BLOOD BY AUTOMATED COUNT: 12.3 % (ref 12.3–15.4)
GFR SERPL CREATININE-BSD FRML MDRD: 84 ML/MIN/1.73
GLOBULIN UR ELPH-MCNC: 2.3 GM/DL
GLUCOSE SERPL-MCNC: 70 MG/DL (ref 65–99)
HCT VFR BLD AUTO: 40 % (ref 34–46.6)
HGB BLD-MCNC: 13.1 G/DL (ref 12–15.9)
IMM GRANULOCYTES # BLD AUTO: 0.02 10*3/MM3 (ref 0–0.05)
IMM GRANULOCYTES NFR BLD AUTO: 0.3 % (ref 0–0.5)
LYMPHOCYTES # BLD AUTO: 1.95 10*3/MM3 (ref 0.7–3.1)
LYMPHOCYTES NFR BLD AUTO: 24.7 % (ref 19.6–45.3)
MCH RBC QN AUTO: 30.1 PG (ref 26.6–33)
MCHC RBC AUTO-ENTMCNC: 32.8 G/DL (ref 31.5–35.7)
MCV RBC AUTO: 92 FL (ref 79–97)
MONOCYTES # BLD AUTO: 0.55 10*3/MM3 (ref 0.1–0.9)
MONOCYTES NFR BLD AUTO: 7 % (ref 5–12)
NEUTROPHILS NFR BLD AUTO: 5.27 10*3/MM3 (ref 1.7–7)
NEUTROPHILS NFR BLD AUTO: 66.8 % (ref 42.7–76)
NRBC BLD AUTO-RTO: 0 /100 WBC (ref 0–0.2)
PLATELET # BLD AUTO: 264 10*3/MM3 (ref 140–450)
PMV BLD AUTO: 9.3 FL (ref 6–12)
POTASSIUM SERPL-SCNC: 3.5 MMOL/L (ref 3.5–5.2)
PROT SERPL-MCNC: 6.6 G/DL (ref 6–8.5)
RBC # BLD AUTO: 4.35 10*6/MM3 (ref 3.77–5.28)
SODIUM SERPL-SCNC: 140 MMOL/L (ref 136–145)
WBC # BLD AUTO: 7.88 10*3/MM3 (ref 3.4–10.8)

## 2020-08-18 PROCEDURE — 99213 OFFICE O/P EST LOW 20 MIN: CPT | Performed by: NURSE PRACTITIONER

## 2020-08-18 PROCEDURE — 76817 TRANSVAGINAL US OBSTETRIC: CPT | Performed by: NURSE PRACTITIONER

## 2020-08-18 PROCEDURE — 36415 COLL VENOUS BLD VENIPUNCTURE: CPT

## 2020-08-18 PROCEDURE — 85025 COMPLETE CBC W/AUTO DIFF WBC: CPT | Performed by: NURSE PRACTITIONER

## 2020-08-18 PROCEDURE — 80053 COMPREHEN METABOLIC PANEL: CPT | Performed by: NURSE PRACTITIONER

## 2020-08-18 PROCEDURE — 25010000002 METHOTREXATE PF 100 MG/4ML SOLUTION: Performed by: NURSE PRACTITIONER

## 2020-08-18 PROCEDURE — 96401 CHEMO ANTI-NEOPL SQ/IM: CPT

## 2020-08-18 RX ORDER — METHOTREXATE 25 MG/ML
50 INJECTION INTRA-ARTERIAL; INTRAMUSCULAR; INTRATHECAL; INTRAVENOUS ONCE
Status: CANCELLED
Start: 2020-08-18

## 2020-08-18 RX ORDER — METHOTREXATE 25 MG/ML
50 INJECTION INTRA-ARTERIAL; INTRAMUSCULAR; INTRATHECAL; INTRAVENOUS ONCE
Status: CANCELLED | OUTPATIENT
Start: 2020-08-18

## 2020-08-18 RX ORDER — METHOTREXATE 25 MG/ML
50 INJECTION INTRA-ARTERIAL; INTRAMUSCULAR; INTRATHECAL; INTRAVENOUS ONCE
Status: COMPLETED | OUTPATIENT
Start: 2020-08-18 | End: 2020-08-18

## 2020-08-18 RX ADMIN — METHOTREXATE 88 MG: 25 SOLUTION INTRA-ARTERIAL; INTRAMUSCULAR; INTRATHECAL; INTRAVENOUS at 18:47

## 2020-08-18 NOTE — PROGRESS NOTES
Patient arrived to [facility] alone.  She states her  is at home with their child.  Stat labs drawn and walked up and logged in.  Patient was given information of expected time involved with resulting of labs and pharmacy to get the medication ready once lab parameters were reviewed.  History reviewed and patient was walked up to [address] [facility] [place] by permission of Ariana Baird RN.

## 2020-08-18 NOTE — PROGRESS NOTES
Subjective     CC: Follow up US and abnormal rise in quants.     Hilary Morgan is a 29 y.o.  whose LMP is No LMP recorded.. She presents for follow up. She has a suspected ectopic pregnancy d/t inappropriate rise in quant levels and no IUP being identified on US following an episode of heavy vaginal bleeding and cramping. She denies pain. She reports she continues to have mild spotting today.     She has a complicated medical history.       HPI    HPI    The following portions of the patient's history were reviewed and updated as appropriate:vital signs, allergies, current medications, past medical history, past social history, past surgical history and problem list      Review of Systems     Review of Systems   Constitutional: Positive for fatigue.   Gastrointestinal: Negative.    Genitourinary: Positive for vaginal bleeding. Negative for pelvic pain and vaginal discharge.   Musculoskeletal: Negative.    Skin: Negative.    Psychiatric/Behavioral: Negative.        Objective      There were no vitals taken for this visit.    Physical Exam    Physical Exam   Constitutional: She is oriented to person, place, and time. She appears well-developed and well-nourished.   Pulmonary/Chest: Effort normal. No respiratory distress.   Abdominal: Soft.   Musculoskeletal: Normal range of motion. She exhibits no edema.   Neurological: She is alert and oriented to person, place, and time.   Skin: Skin is warm and dry.   Psychiatric: She has a normal mood and affect. Her behavior is normal.   Vitals reviewed.      Lab Review   Labs: HCG - quantitative, ABO/RH    Quant: () 681, (8/10) 612, and () 510.     Imaging   Ultrasound - Pelvic Vaginal. US IMP: The uterus is anteverted and appears normal in shape and contour. EL 0.3cm. Ovaries appears WNL There is fluid seen within the right cul de sac which measures 2.7 x 0.5cm. There is a cystic structure seen in the superior portion of the right ovary measuring 1.6 x 1.1cm.  This area is surrounded by blood flow. (?ectopic vs. ovarian cyst)    US 8/12/20: ANTEVERTED UTERUS. ENDO= .3 CM, SMOOTH APPEARING. NL RT OV. NL LT OV. NO FREE FLUID. NO IUP SEEN. NL APPEARING ADNEXA. NO ECTOPIC SEEN    Assessment  Diagnoses and all orders for this visit:    Inappropriate level of quantitative hCG for gestational age in early pregnancy  -     Cancel: CBC & Differential  -     Cancel: Comprehensive Metabolic Panel  -     HCG, B-subunit, Quantitative    Right ovarian pregnancy without intrauterine pregnancy  -     Ambulatory Referral to [facility] For Infusion Treatment        Additional Assessment:   1. Ectopic pregnancy  2. Rh +    Plan     1. Ectopic pregnancy- Suspected ectopic pregnancy due to abnormal rising quant levels.  Ultrasound reviewed with patient today.  Appears she has AN ovarian cyst but some concerns over possible right ovarian pregnancy.  Dr. Duong into the room to discuss with patient as well today.  Due to her complicated history, she is a high surgical risk and it is suggested that she take conservative management with methotrexate.  Methotrexate was previously discussed with the patient last week.  Patient declined at the time but is now in agreement with methotrexate following a second opinion that recommended methotrexate as well.  Plan to check baseline CBC and CMP.  Therapy plan placed in epic.  Reviewed warning signs and symptoms.  Discussed side effects of methotrexate.  2. Scheduled for: STD Labs - N/A , Ultrasound of the -  PELVIC , Mammography - N/A , Bone Density Test - NO , Additional Labs - CBC and CMP    Plan to follow up in 1 week for repeat US and quant       Ana Carreon, SIRENA  8/18/2020

## 2020-08-19 LAB
ALBUMIN SERPL-MCNC: 4.5 G/DL (ref 3.9–5)
ALBUMIN/GLOB SERPL: 2.3 {RATIO} (ref 1.2–2.2)
ALP SERPL-CCNC: 54 IU/L (ref 39–117)
ALT SERPL-CCNC: 14 IU/L (ref 0–32)
AST SERPL-CCNC: 19 IU/L (ref 0–40)
BASOPHILS # BLD AUTO: 0 X10E3/UL (ref 0–0.2)
BASOPHILS NFR BLD AUTO: 1 %
BILIRUB SERPL-MCNC: 0.5 MG/DL (ref 0–1.2)
BUN SERPL-MCNC: 11 MG/DL (ref 6–20)
BUN/CREAT SERPL: 13 (ref 9–23)
CALCIUM SERPL-MCNC: 9.4 MG/DL (ref 8.7–10.2)
CHLORIDE SERPL-SCNC: 103 MMOL/L (ref 96–106)
CO2 SERPL-SCNC: 24 MMOL/L (ref 20–29)
CREAT SERPL-MCNC: 0.82 MG/DL (ref 0.57–1)
EOSINOPHIL # BLD AUTO: 0.1 X10E3/UL (ref 0–0.4)
EOSINOPHIL NFR BLD AUTO: 1 %
ERYTHROCYTE [DISTWIDTH] IN BLOOD BY AUTOMATED COUNT: 12.2 % (ref 11.7–15.4)
GLOBULIN SER CALC-MCNC: 2 G/DL (ref 1.5–4.5)
GLUCOSE SERPL-MCNC: 78 MG/DL (ref 65–99)
HCG INTACT+B SERPL-ACNC: 583 MIU/ML
HCT VFR BLD AUTO: 36.3 % (ref 34–46.6)
HGB BLD-MCNC: 12.2 G/DL (ref 11.1–15.9)
IMM GRANULOCYTES # BLD AUTO: 0 X10E3/UL (ref 0–0.1)
IMM GRANULOCYTES NFR BLD AUTO: 0 %
LYMPHOCYTES # BLD AUTO: 1.8 X10E3/UL (ref 0.7–3.1)
LYMPHOCYTES NFR BLD AUTO: 31 %
MCH RBC QN AUTO: 30 PG (ref 26.6–33)
MCHC RBC AUTO-ENTMCNC: 33.6 G/DL (ref 31.5–35.7)
MCV RBC AUTO: 89 FL (ref 79–97)
MONOCYTES # BLD AUTO: 0.4 X10E3/UL (ref 0.1–0.9)
MONOCYTES NFR BLD AUTO: 8 %
NEUTROPHILS # BLD AUTO: 3.4 X10E3/UL (ref 1.4–7)
NEUTROPHILS NFR BLD AUTO: 59 %
PLATELET # BLD AUTO: 244 X10E3/UL (ref 150–450)
POTASSIUM SERPL-SCNC: 4 MMOL/L (ref 3.5–5.2)
PROT SERPL-MCNC: 6.5 G/DL (ref 6–8.5)
RBC # BLD AUTO: 4.07 X10E6/UL (ref 3.77–5.28)
SODIUM SERPL-SCNC: 141 MMOL/L (ref 134–144)
WBC # BLD AUTO: 5.7 X10E3/UL (ref 3.4–10.8)

## 2020-08-25 ENCOUNTER — PROCEDURE VISIT (OUTPATIENT)
Dept: OBSTETRICS AND GYNECOLOGY | Facility: CLINIC | Age: 30
End: 2020-08-25

## 2020-08-25 DIAGNOSIS — O00.201 RIGHT OVARIAN PREGNANCY WITHOUT INTRAUTERINE PREGNANCY: Primary | ICD-10-CM

## 2020-08-25 PROCEDURE — 76830 TRANSVAGINAL US NON-OB: CPT | Performed by: NURSE PRACTITIONER

## 2020-08-28 ENCOUNTER — OFFICE VISIT (OUTPATIENT)
Dept: OBSTETRICS AND GYNECOLOGY | Facility: CLINIC | Age: 30
End: 2020-08-28

## 2020-08-28 VITALS
WEIGHT: 153 LBS | DIASTOLIC BLOOD PRESSURE: 78 MMHG | HEIGHT: 64 IN | SYSTOLIC BLOOD PRESSURE: 120 MMHG | BODY MASS INDEX: 26.12 KG/M2

## 2020-08-28 DIAGNOSIS — O02.81 INAPPROPRIATE LEVEL OF QUANTITATIVE HCG FOR GESTATIONAL AGE IN EARLY PREGNANCY: ICD-10-CM

## 2020-08-28 DIAGNOSIS — O00.209 OVARIAN PREGNANCY WITHOUT INTRAUTERINE PREGNANCY, UNSPECIFIED LATERALITY: Primary | ICD-10-CM

## 2020-08-28 PROBLEM — O00.201 RIGHT OVARIAN PREGNANCY WITHOUT INTRAUTERINE PREGNANCY: Status: ACTIVE | Noted: 2020-08-28

## 2020-08-28 LAB
ALBUMIN SERPL-MCNC: 4.6 G/DL (ref 3.5–5.2)
ALBUMIN/GLOB SERPL: 2.6 G/DL
ALP SERPL-CCNC: 52 U/L (ref 39–117)
ALT SERPL-CCNC: 15 U/L (ref 1–33)
AST SERPL-CCNC: 16 U/L (ref 1–32)
BILIRUB SERPL-MCNC: 0.3 MG/DL (ref 0–1.2)
BUN SERPL-MCNC: 16 MG/DL (ref 6–20)
BUN/CREAT SERPL: 18.8 (ref 7–25)
CALCIUM SERPL-MCNC: 9.1 MG/DL (ref 8.6–10.5)
CHLORIDE SERPL-SCNC: 104 MMOL/L (ref 98–107)
CO2 SERPL-SCNC: 24.4 MMOL/L (ref 22–29)
CREAT SERPL-MCNC: 0.85 MG/DL (ref 0.57–1)
GLOBULIN SER CALC-MCNC: 1.8 GM/DL
GLUCOSE SERPL-MCNC: 87 MG/DL (ref 65–99)
HCG INTACT+B SERPL-ACNC: 204.9 MIU/ML
POTASSIUM SERPL-SCNC: 4 MMOL/L (ref 3.5–5.2)
PROT SERPL-MCNC: 6.4 G/DL (ref 6–8.5)
SODIUM SERPL-SCNC: 139 MMOL/L (ref 136–145)

## 2020-08-28 PROCEDURE — 99213 OFFICE O/P EST LOW 20 MIN: CPT | Performed by: OBSTETRICS & GYNECOLOGY

## 2020-08-28 NOTE — PROGRESS NOTES
"      Hilary Morgan is a 29 y.o. patient who presents for follow up of   Chief Complaint   Patient presents with   • Follow-up       HPI 29-year-old -0-0-1 with ovarian ectopic pregnancy.  She received methotrexate after an appropriate rise of hCG and no adnexal mass noted on ultrasound.  She is doing quite well.  She has some irregular bleeding but no abdominal pain.  She has no other symptoms.  She is here for first follow-up status post methotrexate.    The following portions of the patient's history were reviewed and updated as appropriate: allergies, current medications and problem list.    Review of Systems   Constitutional: Negative for appetite change, fever and unexpected weight change.   HENT: Negative for congestion and sore throat.    Respiratory: Negative for cough and shortness of breath.    Cardiovascular: Negative for chest pain and palpitations.   Gastrointestinal: Negative for abdominal distention, abdominal pain, constipation, diarrhea, nausea and vomiting.   Endocrine: Negative.    Genitourinary: Positive for vaginal bleeding. Negative for dyspareunia, menstrual problem, pelvic pain and vaginal discharge.   Skin: Negative.    Neurological: Negative for dizziness and syncope.   Hematological: Negative.    Psychiatric/Behavioral: Negative for dysphoric mood and sleep disturbance. The patient is not nervous/anxious.        /78   Ht 162.6 cm (64.02\")   Wt 69.4 kg (153 lb)   LMP 2020 (Exact Date)   BMI 26.25 kg/m²     Physical Exam   Constitutional: She is oriented to person, place, and time. She appears well-developed and well-nourished.   HENT:   Head: Normocephalic and atraumatic.   Pulmonary/Chest: Effort normal. No respiratory distress.   Abdominal: Soft. She exhibits no distension and no mass. There is no tenderness. There is no rebound and no guarding.   Musculoskeletal: Normal range of motion.   Neurological: She is alert and oriented to person, place, and time.   Skin: " Skin is warm and dry.   Psychiatric: She has a normal mood and affect. Her behavior is normal. Judgment and thought content normal.   Nursing note and vitals reviewed.        Assessment/Plan    Hilary was seen today for follow-up.    Diagnoses and all orders for this visit:    Ovarian pregnancy without intrauterine pregnancy, unspecified laterality  -     HCG, B-subunit, Quantitative  -     Comprehensive Metabolic Panel    Inappropriate level of quantitative hCG for gestational age in early pregnancy  -     HCG, B-subunit, Quantitative  -     Comprehensive Metabolic Panel    Quant fell to 292.  Recheck hCG as well as LFTs today.  We will follow-up by phone on Monday.  Has follow-up next Tuesday.    Return in about 2 weeks (around 9/11/2020) for Follow up with me.      Jeferson Rocha MD  8/28/2020  10:02

## 2020-09-15 ENCOUNTER — OFFICE VISIT (OUTPATIENT)
Dept: OBSTETRICS AND GYNECOLOGY | Facility: CLINIC | Age: 30
End: 2020-09-15

## 2020-09-15 VITALS
DIASTOLIC BLOOD PRESSURE: 70 MMHG | HEIGHT: 64 IN | SYSTOLIC BLOOD PRESSURE: 116 MMHG | BODY MASS INDEX: 25.73 KG/M2 | WEIGHT: 150.7 LBS

## 2020-09-15 DIAGNOSIS — O00.201 RIGHT OVARIAN PREGNANCY WITHOUT INTRAUTERINE PREGNANCY: Primary | ICD-10-CM

## 2020-09-15 DIAGNOSIS — Z13.9 SCREENING FOR CONDITION: ICD-10-CM

## 2020-09-15 LAB
B-HCG UR QL: NEGATIVE
INTERNAL NEGATIVE CONTROL: NEGATIVE
INTERNAL POSITIVE CONTROL: POSITIVE
Lab: 55

## 2020-09-15 PROCEDURE — 99213 OFFICE O/P EST LOW 20 MIN: CPT | Performed by: OBSTETRICS & GYNECOLOGY

## 2020-09-15 PROCEDURE — 81025 URINE PREGNANCY TEST: CPT | Performed by: OBSTETRICS & GYNECOLOGY

## 2020-09-15 RX ORDER — ASCORBIC ACID 125 MG
1 TABLET,CHEWABLE ORAL
COMMUNITY

## 2020-09-15 NOTE — PROGRESS NOTES
"Hilary Morgan is a 29 y.o. patient who presents for follow up of   Chief Complaint   Patient presents with   • Follow-up     ovarian pregnancy       HPI 29-year-old with ovarian ectopic presents for follow-up.  She received received 1 dose of methotrexate.  Her levels have fallen nicely.  Last hCG was 22.  She has no symptoms but started bleeding 2 days ago.  It is like a heavy period.  It is red with clots and some tissue.  She feels fine.  She denies any orthostatic symptoms.    The following portions of the patient's history were reviewed and updated as appropriate: allergies, current medications and problem list.    Review of Systems   Constitutional: Negative for appetite change, fever and unexpected weight change.   HENT: Negative for congestion and sore throat.    Respiratory: Negative for cough and shortness of breath.    Cardiovascular: Negative for chest pain and palpitations.   Gastrointestinal: Negative for abdominal distention, abdominal pain, constipation, diarrhea, nausea and vomiting.   Endocrine: Negative.    Genitourinary: Positive for vaginal bleeding. Negative for dyspareunia, menstrual problem, pelvic pain and vaginal discharge.   Skin: Negative.    Neurological: Negative for dizziness and syncope.   Hematological: Negative.    Psychiatric/Behavioral: Negative for dysphoric mood and sleep disturbance. The patient is not nervous/anxious.        /70   Ht 162.6 cm (64.02\")   Wt 68.4 kg (150 lb 11.2 oz)   LMP 09/14/2020 (Exact Date)   Breastfeeding No   BMI 25.85 kg/m²     Physical Exam  Vitals signs and nursing note reviewed.   Constitutional:       Appearance: She is well-developed.   HENT:      Head: Normocephalic and atraumatic.   Pulmonary:      Effort: Pulmonary effort is normal. No respiratory distress.   Abdominal:      General: There is no distension.      Palpations: Abdomen is soft. There is no mass.      Tenderness: There is no abdominal tenderness. There is no " guarding or rebound.   Musculoskeletal: Normal range of motion.   Skin:     General: Skin is warm and dry.   Neurological:      Mental Status: She is alert and oriented to person, place, and time.   Psychiatric:         Behavior: Behavior normal.         Thought Content: Thought content normal.         Judgment: Judgment normal.     Urine hCG is negative today.      Assessment/Plan    Hilary was seen today for follow-up.    Diagnoses and all orders for this visit:    Right ovarian pregnancy without intrauterine pregnancy    Screening for condition  -     POC Pregnancy, Urine    Patient had questions about ectopic pregnancy, risk of recurrence and when she should try again.  We reviewed these in detail.  She would like to do another ultrasound to compare to what we thought was a right lower quadrant pregnancy.  This to be scheduled in 2 weeks.    Return in about 2 weeks (around 9/29/2020) for US, TV + f/u with MD.      Jeferson Rocha MD  9/15/2020  16:39 EDT